# Patient Record
Sex: FEMALE | Race: OTHER | NOT HISPANIC OR LATINO | Employment: UNEMPLOYED | URBAN - METROPOLITAN AREA
[De-identification: names, ages, dates, MRNs, and addresses within clinical notes are randomized per-mention and may not be internally consistent; named-entity substitution may affect disease eponyms.]

---

## 2020-11-11 ENCOUNTER — HISTORICAL (OUTPATIENT)
Dept: ADMINISTRATIVE | Facility: HOSPITAL | Age: 40
End: 2020-11-11

## 2020-11-13 ENCOUNTER — HISTORICAL (OUTPATIENT)
Dept: SURGERY | Facility: HOSPITAL | Age: 40
End: 2020-11-13

## 2022-04-10 ENCOUNTER — HISTORICAL (OUTPATIENT)
Dept: ADMINISTRATIVE | Facility: HOSPITAL | Age: 42
End: 2022-04-10
Payer: MEDICAID

## 2022-04-11 ENCOUNTER — HISTORICAL (OUTPATIENT)
Dept: ADMINISTRATIVE | Facility: HOSPITAL | Age: 42
End: 2022-04-11
Payer: MEDICAID

## 2022-04-28 VITALS
SYSTOLIC BLOOD PRESSURE: 110 MMHG | WEIGHT: 223.31 LBS | DIASTOLIC BLOOD PRESSURE: 81 MMHG | HEIGHT: 63 IN | BODY MASS INDEX: 39.57 KG/M2

## 2022-04-28 VITALS
SYSTOLIC BLOOD PRESSURE: 110 MMHG | WEIGHT: 223.31 LBS | BODY MASS INDEX: 39.57 KG/M2 | HEIGHT: 63 IN | DIASTOLIC BLOOD PRESSURE: 81 MMHG

## 2022-05-01 NOTE — OP NOTE
Patient:   Nanda No             MRN: [id number]            FIN: [id number]               Age:   40 years     Sex:  Female     :  1980   Associated Diagnoses:   None   Author:   Bernardino Edwards MD      Name: Nanda no  MRN: [id number]  Date of Service: 20     Service: General Surgery     Attending: Dr. Rachel     Primary Surgeon: Dr. Orosco     Assistant Surgeon: Dr. Edwards     Preoperative Diagnosis: Subcutaneous cyst of back     Postoperative Diagnosis: Subcutaneous cyst of back     Procedure: Excision of cyst - back.     Anesthesia: MAC     EBL: 5cc     Specimens: Back cyst     Drains/packing: None     Implant/Devices: None     Procedure in detail:  After adequate anesthesia was achieved, the patient was appropriately prepped and draped in the standard sterile fashion in the left lateral decubitus position. A formal timeout was performed. An elliptical incison was made over the cyst measuring 3x1cm. The dissection was carried through the dermis and into the subcutaneous tissue. Care was taken not to enter cyst cavity. Dissection was carried around the cyst cavity circumferentially. The specimen was passed off the field. The soft tissue defect was closed with 3-0 vicryl. Deep dermal stitches were placed with 3-0 vicryl. The skin was reapproximated with 2-0 Nylon vertical mattress stitches.     At the end of the procedure, all lap, sponge, and instrument counts were correct.  Patient tolerated the procedure well without any obvious complications and was extubated and transferred to the recovery room.

## 2022-05-27 ENCOUNTER — TELEPHONE (OUTPATIENT)
Dept: RHEUMATOLOGY | Facility: CLINIC | Age: 42
End: 2022-05-27
Payer: MEDICAID

## 2022-05-27 NOTE — TELEPHONE ENCOUNTER
"I have not seen this patient before.  I agree that if she needs something Ogen to head to the emergency room and they will contact me.  Thank you BH        Pt called stating that she is a new patient she has an appointment at the end of June. She is worried bc her vision is becoming increasingly worst and is seeing "flashing lights". She was upset and crying worried about her health. I did instruct her that if she felt this needed to be addressed right at this moment to please go to the ER but that I would send you a message and see what we can do to get her help. Pt verbalized understanding.  Please advise  "

## 2022-07-01 ENCOUNTER — OFFICE VISIT (OUTPATIENT)
Dept: RHEUMATOLOGY | Facility: CLINIC | Age: 42
End: 2022-07-01
Payer: MEDICAID

## 2022-07-01 VITALS
HEART RATE: 82 BPM | WEIGHT: 234.19 LBS | SYSTOLIC BLOOD PRESSURE: 121 MMHG | TEMPERATURE: 98 F | RESPIRATION RATE: 18 BRPM | OXYGEN SATURATION: 99 % | HEIGHT: 66 IN | DIASTOLIC BLOOD PRESSURE: 83 MMHG | BODY MASS INDEX: 37.64 KG/M2

## 2022-07-01 DIAGNOSIS — M79.7 FIBROMYALGIA SYNDROME: ICD-10-CM

## 2022-07-01 DIAGNOSIS — G43.111 INTRACTABLE MIGRAINE WITH AURA WITH STATUS MIGRAINOSUS: ICD-10-CM

## 2022-07-01 DIAGNOSIS — G47.00 INSOMNIA, UNSPECIFIED TYPE: ICD-10-CM

## 2022-07-01 DIAGNOSIS — M35.9 UNDIFFERENTIATED CONNECTIVE TISSUE DISEASE: Primary | ICD-10-CM

## 2022-07-01 PROCEDURE — 1159F MED LIST DOCD IN RCRD: CPT | Mod: CPTII,,, | Performed by: INTERNAL MEDICINE

## 2022-07-01 PROCEDURE — 1160F PR REVIEW ALL MEDS BY PRESCRIBER/CLIN PHARMACIST DOCUMENTED: ICD-10-PCS | Mod: CPTII,,, | Performed by: INTERNAL MEDICINE

## 2022-07-01 PROCEDURE — 3008F PR BODY MASS INDEX (BMI) DOCUMENTED: ICD-10-PCS | Mod: CPTII,,, | Performed by: INTERNAL MEDICINE

## 2022-07-01 PROCEDURE — 3079F PR MOST RECENT DIASTOLIC BLOOD PRESSURE 80-89 MM HG: ICD-10-PCS | Mod: CPTII,,, | Performed by: INTERNAL MEDICINE

## 2022-07-01 PROCEDURE — 1160F RVW MEDS BY RX/DR IN RCRD: CPT | Mod: CPTII,,, | Performed by: INTERNAL MEDICINE

## 2022-07-01 PROCEDURE — 99999 PR PBB SHADOW E&M-EST. PATIENT-LVL III: ICD-10-PCS | Mod: PBBFAC,,, | Performed by: INTERNAL MEDICINE

## 2022-07-01 PROCEDURE — 3074F PR MOST RECENT SYSTOLIC BLOOD PRESSURE < 130 MM HG: ICD-10-PCS | Mod: CPTII,,, | Performed by: INTERNAL MEDICINE

## 2022-07-01 PROCEDURE — 99204 OFFICE O/P NEW MOD 45 MIN: CPT | Mod: S$PBB,,, | Performed by: INTERNAL MEDICINE

## 2022-07-01 PROCEDURE — 1159F PR MEDICATION LIST DOCUMENTED IN MEDICAL RECORD: ICD-10-PCS | Mod: CPTII,,, | Performed by: INTERNAL MEDICINE

## 2022-07-01 PROCEDURE — 3079F DIAST BP 80-89 MM HG: CPT | Mod: CPTII,,, | Performed by: INTERNAL MEDICINE

## 2022-07-01 PROCEDURE — 3074F SYST BP LT 130 MM HG: CPT | Mod: CPTII,,, | Performed by: INTERNAL MEDICINE

## 2022-07-01 PROCEDURE — 99213 OFFICE O/P EST LOW 20 MIN: CPT | Mod: PBBFAC | Performed by: INTERNAL MEDICINE

## 2022-07-01 PROCEDURE — 99999 PR PBB SHADOW E&M-EST. PATIENT-LVL III: CPT | Mod: PBBFAC,,, | Performed by: INTERNAL MEDICINE

## 2022-07-01 PROCEDURE — 99204 PR OFFICE/OUTPT VISIT, NEW, LEVL IV, 45-59 MIN: ICD-10-PCS | Mod: S$PBB,,, | Performed by: INTERNAL MEDICINE

## 2022-07-01 PROCEDURE — 3008F BODY MASS INDEX DOCD: CPT | Mod: CPTII,,, | Performed by: INTERNAL MEDICINE

## 2022-07-01 RX ORDER — HYDROXYCHLOROQUINE SULFATE 200 MG/1
200 TABLET, FILM COATED ORAL 2 TIMES DAILY
Qty: 60 TABLET | Refills: 5 | Status: SHIPPED | OUTPATIENT
Start: 2022-07-01 | End: 2022-08-26

## 2022-07-01 RX ORDER — TIZANIDINE 4 MG/1
4 TABLET ORAL NIGHTLY
Qty: 30 TABLET | Refills: 5 | Status: SHIPPED | OUTPATIENT
Start: 2022-07-01 | End: 2022-08-26

## 2022-07-01 RX ORDER — HYDROCODONE BITARTRATE AND ACETAMINOPHEN 5; 325 MG/1; MG/1
1 TABLET ORAL 2 TIMES DAILY PRN
Qty: 60 TABLET | Refills: 0 | Status: SHIPPED | OUTPATIENT
Start: 2022-07-01 | End: 2022-08-02 | Stop reason: SDUPTHER

## 2022-07-01 RX ORDER — SUMATRIPTAN SUCCINATE 100 MG/1
100 TABLET ORAL
Qty: 9 TABLET | Refills: 5 | Status: SHIPPED | OUTPATIENT
Start: 2022-07-01 | End: 2022-08-26

## 2022-07-01 RX ORDER — PROMETHAZINE HYDROCHLORIDE 12.5 MG/1
12.5 TABLET ORAL EVERY 8 HOURS
COMMUNITY
Start: 2022-06-08 | End: 2022-08-26

## 2022-07-01 RX ORDER — ALPRAZOLAM 0.5 MG/1
0.5 TABLET ORAL 2 TIMES DAILY
COMMUNITY
Start: 2022-06-06 | End: 2023-09-12 | Stop reason: SDUPTHER

## 2022-07-01 RX ORDER — LORAZEPAM 1 MG/1
1 TABLET ORAL
COMMUNITY
Start: 2022-06-07 | End: 2022-07-01 | Stop reason: ALTCHOICE

## 2022-07-01 RX ORDER — HYDROCODONE BITARTRATE AND ACETAMINOPHEN 5; 325 MG/1; MG/1
1 TABLET ORAL EVERY 6 HOURS PRN
COMMUNITY
Start: 2022-06-09 | End: 2022-07-01 | Stop reason: SDUPTHER

## 2022-07-01 NOTE — PROGRESS NOTES
"Subjective:       Patient ID: Nanda House is a 42 y.o. female.    Chief Complaint: New patient  (Referral from Julisa May Np... +LORIN.. /Patient has other concerns that she would like/To speak with you about )    Pt  is complaining of joint pain involving her MCP PIP wrist elbow shoulders hips knees and ankles bilaterally.  Is 10/10 in intensity dull in quality and continuous.  It is associated with a morning stiffness lasting for more than 60 minutes. Pt reports having difficulty maintaining a good night of sleep and this has been associated with myalgia of 10/10 in intensity.  This pain is dull continuous and gets worse mainly at night.  It is associated with fatigue.  No fever no chills no others.      Review of Systems   Constitutional: Negative for appetite change, chills and fever.   HENT: Negative for congestion, ear pain, mouth sores, nosebleeds and trouble swallowing.    Eyes: Negative for photophobia and discharge.   Respiratory: Negative for chest tightness and shortness of breath.    Cardiovascular: Negative for chest pain.   Gastrointestinal: Negative for abdominal pain and vomiting.   Endocrine: Negative.    Genitourinary: Negative for hematuria.   Musculoskeletal:        As per HPI   Skin: Negative for rash.   Neurological: Negative for weakness.         Objective:   /83 (BP Location: Right arm, Patient Position: Sitting, BP Method: Large (Automatic))   Pulse 82   Temp 97.8 °F (36.6 °C) (Oral)   Resp 18   Ht 5' 6" (1.676 m)   Wt 106.2 kg (234 lb 3.2 oz)   SpO2 99%   BMI 37.80 kg/m²      Physical Exam   Constitutional: She is oriented to person, place, and time. She appears well-developed and well-nourished. No distress.   HENT:   Head: Normocephalic and atraumatic.   Right Ear: External ear normal.   Left Ear: External ear normal.   Eyes: Pupils are equal, round, and reactive to light.   Cardiovascular: Normal rate, regular rhythm and normal heart sounds.   Pulmonary/Chest: Breath " sounds normal.   Abdominal: Soft. There is no abdominal tenderness.   Musculoskeletal:      Right shoulder: Tenderness present.      Left shoulder: Tenderness present.      Right elbow: Tenderness present.      Left elbow: Tenderness present.      Right wrist: Tenderness present.      Left wrist: Tenderness present.      Cervical back: Neck supple.      Right hip: Tenderness present.      Left hip: Tenderness present.      Right knee: Tenderness present.      Left knee: Tenderness present.      Right ankle: Tenderness present.      Left ankle: Tenderness present.   Lymphadenopathy:     She has no cervical adenopathy.   Neurological: She is alert and oriented to person, place, and time. She displays normal reflexes. No cranial nerve deficit or sensory deficit. She exhibits normal muscle tone. Coordination normal.   Skin: No rash noted. No erythema.   Vitals reviewed.      Right Side Rheumatological Exam     The patient is tender to palpation of the shoulder, elbow, wrist, knee, 1st PIP, 1st MCP, 2nd PIP, 2nd MCP, 3rd PIP, 3rd MCP, 4th PIP, 4th MCP, 5th PIP, hip, ankle, 1st MTP, 2nd MTP, 3rd MTP, 4th MTP, 5th MTP, 1st toe IP, 2nd toe IP, 3rd toe IP, 4th toe IP and 5th toe IP    Left Side Rheumatological Exam     The patient is tender to palpation of the shoulder, elbow, wrist, knee, 1st PIP, 1st MCP, 2nd PIP, 2nd MCP, 3rd PIP, 3rd MCP, 4th PIP, 4th MCP, 5th PIP, 5th MCP, hip, ankle, 1st MTP, 2nd MTP, 3rd MTP, 4th MTP, 5th MTP, 1st toe IP, 2nd toe IP, 3rd toe IP, 4th toe IP and 5th toe IP.         Completed Fibromyalgia exam 18/18 tender points.  No data to display     Assessment:       1. Undifferentiated connective tissue disease    2. Fibromyalgia syndrome    3. Insomnia, unspecified type    4. Intractable migraine with aura with status migrainosus            Plan:       Problem List Items Addressed This Visit    None     Visit Diagnoses     Undifferentiated connective tissue disease    -  Primary    Relevant  Medications    hydrOXYchloroQUINE (PLAQUENIL) 200 mg tablet    tiZANidine (ZANAFLEX) 4 MG tablet    sumatriptan (IMITREX) 100 MG tablet    HYDROcodone-acetaminophen (NORCO) 5-325 mg per tablet    Fibromyalgia syndrome        Relevant Medications    hydrOXYchloroQUINE (PLAQUENIL) 200 mg tablet    tiZANidine (ZANAFLEX) 4 MG tablet    sumatriptan (IMITREX) 100 MG tablet    HYDROcodone-acetaminophen (NORCO) 5-325 mg per tablet    Insomnia, unspecified type        Relevant Medications    hydrOXYchloroQUINE (PLAQUENIL) 200 mg tablet    tiZANidine (ZANAFLEX) 4 MG tablet    sumatriptan (IMITREX) 100 MG tablet    HYDROcodone-acetaminophen (NORCO) 5-325 mg per tablet    Intractable migraine with aura with status migrainosus        Relevant Medications    hydrOXYchloroQUINE (PLAQUENIL) 200 mg tablet    tiZANidine (ZANAFLEX) 4 MG tablet    sumatriptan (IMITREX) 100 MG tablet    HYDROcodone-acetaminophen (NORCO) 5-325 mg per tablet

## 2022-07-06 ENCOUNTER — TELEPHONE (OUTPATIENT)
Dept: RHEUMATOLOGY | Facility: CLINIC | Age: 42
End: 2022-07-06
Payer: MEDICAID

## 2022-07-06 RX ORDER — PROMETHAZINE HYDROCHLORIDE 25 MG/1
25 TABLET ORAL EVERY 4 HOURS PRN
Qty: 60 TABLET | Refills: 3 | Status: SHIPPED | OUTPATIENT
Start: 2022-07-06 | End: 2022-08-26

## 2022-07-06 NOTE — TELEPHONE ENCOUNTER
Please Advise.. Thanks       ----- Message from Whitley Mcconnell sent at 7/6/2022  1:27 PM CDT -----  Regarding: Medication concern  Patient called requesting something for nausea. She said that  the Hydroxychloroquine is making her heart race and need to know if she should continue taking this medication... Please Advise...[phone]

## 2022-07-08 ENCOUNTER — OFFICE VISIT (OUTPATIENT)
Dept: OBSTETRICS AND GYNECOLOGY | Facility: CLINIC | Age: 42
End: 2022-07-08
Payer: MEDICAID

## 2022-07-08 VITALS — DIASTOLIC BLOOD PRESSURE: 100 MMHG | WEIGHT: 237 LBS | BODY MASS INDEX: 38.25 KG/M2 | SYSTOLIC BLOOD PRESSURE: 146 MMHG

## 2022-07-08 DIAGNOSIS — Z98.891 HISTORY OF CESAREAN SECTION: ICD-10-CM

## 2022-07-08 DIAGNOSIS — M32.9 LUPUS: ICD-10-CM

## 2022-07-08 DIAGNOSIS — R10.2 PELVIC PAIN: ICD-10-CM

## 2022-07-08 DIAGNOSIS — D25.9 UTERINE LEIOMYOMA, UNSPECIFIED LOCATION: Primary | ICD-10-CM

## 2022-07-08 DIAGNOSIS — R03.0 ELEVATED BLOOD PRESSURE READING IN OFFICE WITH WHITE COAT SYNDROME, WITHOUT DIAGNOSIS OF HYPERTENSION: ICD-10-CM

## 2022-07-08 PROCEDURE — 3008F BODY MASS INDEX DOCD: CPT | Mod: CPTII,,, | Performed by: OBSTETRICS & GYNECOLOGY

## 2022-07-08 PROCEDURE — 1160F RVW MEDS BY RX/DR IN RCRD: CPT | Mod: CPTII,,, | Performed by: OBSTETRICS & GYNECOLOGY

## 2022-07-08 PROCEDURE — 1159F MED LIST DOCD IN RCRD: CPT | Mod: CPTII,,, | Performed by: OBSTETRICS & GYNECOLOGY

## 2022-07-08 PROCEDURE — 3077F PR MOST RECENT SYSTOLIC BLOOD PRESSURE >= 140 MM HG: ICD-10-PCS | Mod: CPTII,,, | Performed by: OBSTETRICS & GYNECOLOGY

## 2022-07-08 PROCEDURE — 99999 PR PBB SHADOW E&M-EST. PATIENT-LVL II: ICD-10-PCS | Mod: PBBFAC,,, | Performed by: OBSTETRICS & GYNECOLOGY

## 2022-07-08 PROCEDURE — 99204 OFFICE O/P NEW MOD 45 MIN: CPT | Mod: S$PBB,,, | Performed by: OBSTETRICS & GYNECOLOGY

## 2022-07-08 PROCEDURE — 3080F PR MOST RECENT DIASTOLIC BLOOD PRESSURE >= 90 MM HG: ICD-10-PCS | Mod: CPTII,,, | Performed by: OBSTETRICS & GYNECOLOGY

## 2022-07-08 PROCEDURE — 99212 OFFICE O/P EST SF 10 MIN: CPT | Mod: PBBFAC | Performed by: OBSTETRICS & GYNECOLOGY

## 2022-07-08 PROCEDURE — 3077F SYST BP >= 140 MM HG: CPT | Mod: CPTII,,, | Performed by: OBSTETRICS & GYNECOLOGY

## 2022-07-08 PROCEDURE — 1160F PR REVIEW ALL MEDS BY PRESCRIBER/CLIN PHARMACIST DOCUMENTED: ICD-10-PCS | Mod: CPTII,,, | Performed by: OBSTETRICS & GYNECOLOGY

## 2022-07-08 PROCEDURE — 3008F PR BODY MASS INDEX (BMI) DOCUMENTED: ICD-10-PCS | Mod: CPTII,,, | Performed by: OBSTETRICS & GYNECOLOGY

## 2022-07-08 PROCEDURE — 1159F PR MEDICATION LIST DOCUMENTED IN MEDICAL RECORD: ICD-10-PCS | Mod: CPTII,,, | Performed by: OBSTETRICS & GYNECOLOGY

## 2022-07-08 PROCEDURE — 99204 PR OFFICE/OUTPT VISIT, NEW, LEVL IV, 45-59 MIN: ICD-10-PCS | Mod: S$PBB,,, | Performed by: OBSTETRICS & GYNECOLOGY

## 2022-07-08 PROCEDURE — 99999 PR PBB SHADOW E&M-EST. PATIENT-LVL II: CPT | Mod: PBBFAC,,, | Performed by: OBSTETRICS & GYNECOLOGY

## 2022-07-08 PROCEDURE — 3080F DIAST BP >= 90 MM HG: CPT | Mod: CPTII,,, | Performed by: OBSTETRICS & GYNECOLOGY

## 2022-07-08 NOTE — PROGRESS NOTES
Subjective:    Patient ID: Nanda House is a 42 y.o. y.o. female    Chief Complaint:   Chief Complaint   Patient presents with    Pelvic Pain       History of Present Illness:  Nanda presents today for discussion of pelvic pain and bleeding.  States has been having pain associated with her cycles for several months now.  She also complains that her cycles have been more irregular.  She has some urinary frequency and constantly feeling like she just use the restroom.  She feels a heaviness in the bottom of her pelvis      Review of Systems   Constitutional: Negative for chills, fatigue and fever.   Respiratory: Negative for shortness of breath.    Cardiovascular: Negative for chest pain.   Gastrointestinal: Negative for abdominal pain, constipation, diarrhea and nausea.   Genitourinary: Positive for frequency and pelvic pain. Negative for bladder incontinence, dysuria, hot flashes and vaginal bleeding.   Neurological: Negative for headaches.   Psychiatric/Behavioral: Negative for depression.         Objective:    Vital Signs:  Vitals:    07/08/22 1022   BP: (!) 146/100     Wt Readings from Last 1 Encounters:   07/08/22 107.5 kg (237 lb)     Body mass index is 38.25 kg/m².    Physical Exam:  General:  alert, no distress   Abdomen:  Soft, nontender.  Mass noted in suprapubic region consistent with enlarged fibroid uterus. no rebound or guarding noted   Extremities: No cyanosis, clubbing, edema     Ul[facility]ound from [facility] reviewed.  Enlarged uterus with 2 cm endometrial stripe and 5 cm anterior fundal fibroid.  Multiple nabothian cysts noted some are large almost 2 cm in diameter    Discussed with patient that the large anterior fibroid is most likely pressing on her bladder causing her the symptoms of urgency and frequency as well as the feeling of fullness and pressure in her pelvis.  Discussed that hysterectomy would be most definitive in treatment of many of her symptoms.  Also explained the workup  involved and that is necessary.  Her history includes  and newly diagnosed lupus.  Explained she will require medical clearance from her rheumatologist prior to surgery. She understands this need.  All questions answered    I spent a total of 45 minutes on the day of the visit.  This includes face to face time and non-face to face time preparing to see the patient (eg, review of tests), obtaining and/or reviewing separately obtained history, documenting clinical information in the electronic or other health record, independently interpreting results and communicating results to the patient/family/caregiver, or care coordinator.      Assessment:      1. Uterine leiomyoma, unspecified location    2. History of  section    3. Pelvic pain    4. Lupus    5. Elevated blood pressure reading in office with white coat syndrome, without diagnosis of hypertension          Plan:      Uterine leiomyoma, unspecified location    History of  section    Pelvic pain    Lupus    Elevated blood pressure reading in office with white coat syndrome, without diagnosis of hypertension       Endometrial biopsy and ultrasound  Will schedule total laparoscopic hysterectomy       Amira Ryder MD, FACOG   2022 10:41 AM

## 2022-07-11 ENCOUNTER — TELEPHONE (OUTPATIENT)
Dept: RHEUMATOLOGY | Facility: CLINIC | Age: 42
End: 2022-07-11
Payer: MEDICAID

## 2022-07-11 NOTE — TELEPHONE ENCOUNTER
Please Advise.. Thanks       ----- Message from Whitley Mcconnell sent at 7/11/2022  7:56 AM CDT -----  Regarding: Surgery clearance  Patient called requesting clearance to have surgery. Is she healthy enough for anesthesia. Please call.

## 2022-07-18 ENCOUNTER — TELEPHONE (OUTPATIENT)
Dept: OBSTETRICS AND GYNECOLOGY | Facility: CLINIC | Age: 42
End: 2022-07-18

## 2022-07-18 RX ORDER — NAPROXEN 500 MG/1
TABLET ORAL
Qty: 20 TABLET | Refills: 0 | Status: SHIPPED | OUTPATIENT
Start: 2022-07-18 | End: 2022-08-26

## 2022-07-18 NOTE — TELEPHONE ENCOUNTER
Pt called about procedure appt for EMBX on 7/22; she said Dr. Ryder told her that the procedure would be painful, and is asking for something to be sent to the pharmacy before the appt. Pt is also supposed to have surgery soon

## 2022-07-25 ENCOUNTER — PROCEDURE VISIT (OUTPATIENT)
Dept: OBSTETRICS AND GYNECOLOGY | Facility: CLINIC | Age: 42
End: 2022-07-25
Payer: MEDICAID

## 2022-07-25 VITALS — DIASTOLIC BLOOD PRESSURE: 110 MMHG | SYSTOLIC BLOOD PRESSURE: 150 MMHG | BODY MASS INDEX: 39.22 KG/M2 | WEIGHT: 243 LBS

## 2022-07-25 VITALS — BODY MASS INDEX: 39.22 KG/M2 | WEIGHT: 243 LBS

## 2022-07-25 DIAGNOSIS — M32.9 LUPUS: ICD-10-CM

## 2022-07-25 DIAGNOSIS — N93.9 VAGINAL BLEEDING: Primary | ICD-10-CM

## 2022-07-25 DIAGNOSIS — D25.9 UTERINE LEIOMYOMA, UNSPECIFIED LOCATION: ICD-10-CM

## 2022-07-25 DIAGNOSIS — N92.1 MENORRHAGIA WITH IRREGULAR CYCLE: ICD-10-CM

## 2022-07-25 DIAGNOSIS — F41.9 ANXIETY: ICD-10-CM

## 2022-07-25 DIAGNOSIS — Z32.02 NEGATIVE PREGNANCY TEST: Primary | ICD-10-CM

## 2022-07-25 DIAGNOSIS — R93.89 THICKENED ENDOMETRIUM: ICD-10-CM

## 2022-07-25 DIAGNOSIS — R10.2 PELVIC PAIN: ICD-10-CM

## 2022-07-25 DIAGNOSIS — R03.0 ELEVATED BLOOD PRESSURE READING IN OFFICE WITH WHITE COAT SYNDROME, WITHOUT DIAGNOSIS OF HYPERTENSION: ICD-10-CM

## 2022-07-25 LAB
B-HCG UR QL: NEGATIVE
CTP QC/QA: YES

## 2022-07-25 PROCEDURE — 58100 ENDOMETRIAL BIOPSY: ICD-10-PCS | Mod: S$PBB,,, | Performed by: OBSTETRICS & GYNECOLOGY

## 2022-07-25 PROCEDURE — 81025 URINE PREGNANCY TEST: CPT | Mod: PBBFAC | Performed by: OBSTETRICS & GYNECOLOGY

## 2022-07-25 PROCEDURE — 58100 BIOPSY OF UTERUS LINING: CPT | Mod: PBBFAC | Performed by: OBSTETRICS & GYNECOLOGY

## 2022-07-25 NOTE — PROCEDURES
Endometrial biopsy    Date/Time: 7/25/2022 1:30 PM  Performed by: Amira Ryder MD  Authorized by: Amira Ryder MD     Consent:     Consent obtained:  Written    Consent given by:  Patient    Patient questions answered: yes      Patient agrees, verbalizes understanding, and wants to proceed: yes      Instructions and paperwork completed: yes    Indication:     Indications: Menorrhagia    Pre-procedure:     Pre-procedure timeout performed: yes    Procedure:     Procedure: endometrial biopsy with Pipelle      Cervix cleaned and prepped: yes (betadine)      The cervix was dilated: yes (os finder used)      Uterus sounded: yes      Uterus sound depth (cm):  7    Specimen collected: specimen collected and sent to pathology      Patient tolerated procedure well with no complications: yes    Comments:     Procedure comments:  Minimal tissue obtained with 2 passes.     Ultrasound shows 4.8 cm fibroid. Endo thickness 1 cm with probable 1.8 cm endometrial polyp seen.

## 2022-07-29 NOTE — PROGRESS NOTES
Please call patient regarding results.  No endometrial tissue seen. Will need to do D&C with frozen before her hyst. Please also notify OR /surgery department of this so we can schedule pathologist to be present

## 2022-08-02 DIAGNOSIS — G47.00 INSOMNIA, UNSPECIFIED TYPE: ICD-10-CM

## 2022-08-02 DIAGNOSIS — M79.7 FIBROMYALGIA SYNDROME: ICD-10-CM

## 2022-08-02 DIAGNOSIS — M35.9 UNDIFFERENTIATED CONNECTIVE TISSUE DISEASE: ICD-10-CM

## 2022-08-02 DIAGNOSIS — G43.111 INTRACTABLE MIGRAINE WITH AURA WITH STATUS MIGRAINOSUS: ICD-10-CM

## 2022-08-03 RX ORDER — HYDROCODONE BITARTRATE AND ACETAMINOPHEN 5; 325 MG/1; MG/1
1 TABLET ORAL 2 TIMES DAILY PRN
Qty: 60 TABLET | Refills: 0 | Status: ON HOLD | OUTPATIENT
Start: 2022-08-03 | End: 2022-08-31 | Stop reason: HOSPADM

## 2022-08-09 LAB — TISSUE SPECIMEN TO PATHOLOGY, OB/GYN: NORMAL

## 2022-08-10 ENCOUNTER — TELEPHONE (OUTPATIENT)
Dept: OBSTETRICS AND GYNECOLOGY | Facility: CLINIC | Age: 42
End: 2022-08-10
Payer: MEDICAID

## 2022-08-10 NOTE — TELEPHONE ENCOUNTER
Pt called, states she does not want a hysterectomy anymore. Wants to change her surgery to cyst removal/endometriosis removal.

## 2022-08-10 NOTE — TELEPHONE ENCOUNTER
"If the "cyst removal" is in reference to the Nabothian cyst. They are only removed with the cervix (as in hysterectomy). What I can do is a D&C with hysteroscopy since the endometrial biopsy was basically inconclusive. (currently scheduled for frozen section at time of hysterectomy) and laparoscopy with evaluation and ablation of any endometriosis implants visualized."

## 2022-08-24 ENCOUNTER — ANESTHESIA EVENT (OUTPATIENT)
Dept: SURGERY | Facility: HOSPITAL | Age: 42
DRG: 743 | End: 2022-08-24
Payer: MEDICAID

## 2022-08-25 NOTE — DISCHARGE INSTRUCTIONS
BEFORE THE PROCEDURE:    REPORT ANY CHANGE IN YOUR PHYSICAL CONDITION TO YOUR DOCTOR IMMEDIATELY.  SELF ISOLATE AND CHECK TEMPERATURE DAILY, IF TEMP OVER 100, CALL PHYSICIAN IMMEDIATELY.  TRY TO REFRAIN FROM SMOKING AND ALCOHOL 72 HOURS BEFORE YOUR PROCEDURE.   DO NOT EAT OR DRINK ANYTHING AFTER MIDNIGHT THE NIGHT BEFORE YOUR PROCEDURE.  NO MAKE UP, NAIL POLISH OR JEWELRY.      SURGERY PREP INSTRUCTIONS    CHECK INTO FIRST FLOOR REGISTRATION AT 8 A.M.      DAY OF YOUR PROCEDURE:    TAKE BLOOD PRESSURE MEDICATIONS THE MORNING OF YOUR PROCEDURE, WITH SMALL SIPS WATER, AS DIRECTED BY YOUR PHYSICIAN.   DO NOT TAKE ANY DIABETIC MEDICATIONS UNLESS DIRECTED TO DO SO BY YOUR PHYSICIAN.   CONTACT LENSES AND DENTURES MUST BE REMOVED.  A RESPONSIBLE ADULT MUST ACCOMPANY YOU HOME UPON DISCHARGE.   ONLY 1 VISITOR ALLOWED PER ROOM.     Covid testing today    YOUR THOUGHTS AND OPINIONS HELP US TO BETTER SERVE YOU.     PLEASE PARTICIPATE IN SURVEYS ABOUT YOUR CARE.    THANK YOU FOR CHOOSING [facility].

## 2022-08-26 ENCOUNTER — HOSPITAL ENCOUNTER (OUTPATIENT)
Dept: RADIOLOGY | Facility: HOSPITAL | Age: 42
Discharge: HOME | End: 2022-08-26
Attending: OBSTETRICS & GYNECOLOGY
Payer: MEDICAID

## 2022-08-26 ENCOUNTER — HOSPITAL ENCOUNTER (OUTPATIENT)
Dept: PULMONOLOGY | Facility: HOSPITAL | Age: 42
Discharge: HOME | End: 2022-08-26
Attending: OBSTETRICS & GYNECOLOGY
Payer: MEDICAID

## 2022-08-26 ENCOUNTER — OFFICE VISIT (OUTPATIENT)
Dept: OBSTETRICS AND GYNECOLOGY | Facility: CLINIC | Age: 42
End: 2022-08-26
Payer: MEDICAID

## 2022-08-26 ENCOUNTER — HOSPITAL ENCOUNTER (OUTPATIENT)
Dept: PREADMISSION TESTING | Facility: HOSPITAL | Age: 42
Discharge: HOME | End: 2022-08-26
Attending: OBSTETRICS & GYNECOLOGY
Payer: MEDICAID

## 2022-08-26 VITALS
WEIGHT: 234 LBS | SYSTOLIC BLOOD PRESSURE: 128 MMHG | BODY MASS INDEX: 37.61 KG/M2 | HEIGHT: 66 IN | DIASTOLIC BLOOD PRESSURE: 88 MMHG | HEART RATE: 80 BPM

## 2022-08-26 VITALS — BODY MASS INDEX: 33.43 KG/M2 | HEIGHT: 66 IN | WEIGHT: 208 LBS

## 2022-08-26 DIAGNOSIS — R10.2 PELVIC PAIN: Primary | ICD-10-CM

## 2022-08-26 DIAGNOSIS — F41.9 ANXIETY: ICD-10-CM

## 2022-08-26 DIAGNOSIS — D25.9 UTERINE LEIOMYOMA, UNSPECIFIED LOCATION: ICD-10-CM

## 2022-08-26 DIAGNOSIS — M32.9 LUPUS: ICD-10-CM

## 2022-08-26 DIAGNOSIS — F41.9 ANXIETY: Primary | ICD-10-CM

## 2022-08-26 DIAGNOSIS — Z01.818 PRE-OP TESTING: ICD-10-CM

## 2022-08-26 DIAGNOSIS — R10.2 PELVIC PAIN: ICD-10-CM

## 2022-08-26 DIAGNOSIS — R93.89 THICKENED ENDOMETRIUM: ICD-10-CM

## 2022-08-26 DIAGNOSIS — N92.1 MENORRHAGIA WITH IRREGULAR CYCLE: ICD-10-CM

## 2022-08-26 DIAGNOSIS — R03.0 ELEVATED BLOOD PRESSURE READING IN OFFICE WITH WHITE COAT SYNDROME, WITHOUT DIAGNOSIS OF HYPERTENSION: ICD-10-CM

## 2022-08-26 DIAGNOSIS — Z98.891 HISTORY OF CESAREAN SECTION: ICD-10-CM

## 2022-08-26 DIAGNOSIS — D21.9 FIBROIDS: ICD-10-CM

## 2022-08-26 PROCEDURE — 3008F BODY MASS INDEX DOCD: CPT | Mod: CPTII,,, | Performed by: OBSTETRICS & GYNECOLOGY

## 2022-08-26 PROCEDURE — 1160F PR REVIEW ALL MEDS BY PRESCRIBER/CLIN PHARMACIST DOCUMENTED: ICD-10-PCS | Mod: CPTII,,, | Performed by: OBSTETRICS & GYNECOLOGY

## 2022-08-26 PROCEDURE — 3074F PR MOST RECENT SYSTOLIC BLOOD PRESSURE < 130 MM HG: ICD-10-PCS | Mod: CPTII,,, | Performed by: OBSTETRICS & GYNECOLOGY

## 2022-08-26 PROCEDURE — 99999 PR PBB SHADOW E&M-EST. PATIENT-LVL II: ICD-10-PCS | Mod: PBBFAC,,, | Performed by: OBSTETRICS & GYNECOLOGY

## 2022-08-26 PROCEDURE — 1160F RVW MEDS BY RX/DR IN RCRD: CPT | Mod: CPTII,,, | Performed by: OBSTETRICS & GYNECOLOGY

## 2022-08-26 PROCEDURE — 71046 X-RAY EXAM CHEST 2 VIEWS: CPT | Mod: TC

## 2022-08-26 PROCEDURE — 99213 PR OFFICE/OUTPT VISIT, EST, LEVL III, 20-29 MIN: ICD-10-PCS | Mod: S$PBB,,, | Performed by: OBSTETRICS & GYNECOLOGY

## 2022-08-26 PROCEDURE — 99213 OFFICE O/P EST LOW 20 MIN: CPT | Mod: S$PBB,,, | Performed by: OBSTETRICS & GYNECOLOGY

## 2022-08-26 PROCEDURE — 93005 ELECTROCARDIOGRAM TRACING: CPT

## 2022-08-26 PROCEDURE — 3074F SYST BP LT 130 MM HG: CPT | Mod: CPTII,,, | Performed by: OBSTETRICS & GYNECOLOGY

## 2022-08-26 PROCEDURE — 93010 EKG 12-LEAD: ICD-10-PCS | Mod: ,,, | Performed by: INTERNAL MEDICINE

## 2022-08-26 PROCEDURE — 3008F PR BODY MASS INDEX (BMI) DOCUMENTED: ICD-10-PCS | Mod: CPTII,,, | Performed by: OBSTETRICS & GYNECOLOGY

## 2022-08-26 PROCEDURE — 99212 OFFICE O/P EST SF 10 MIN: CPT | Mod: PBBFAC,25 | Performed by: OBSTETRICS & GYNECOLOGY

## 2022-08-26 PROCEDURE — 1159F PR MEDICATION LIST DOCUMENTED IN MEDICAL RECORD: ICD-10-PCS | Mod: CPTII,,, | Performed by: OBSTETRICS & GYNECOLOGY

## 2022-08-26 PROCEDURE — 3079F DIAST BP 80-89 MM HG: CPT | Mod: CPTII,,, | Performed by: OBSTETRICS & GYNECOLOGY

## 2022-08-26 PROCEDURE — 3079F PR MOST RECENT DIASTOLIC BLOOD PRESSURE 80-89 MM HG: ICD-10-PCS | Mod: CPTII,,, | Performed by: OBSTETRICS & GYNECOLOGY

## 2022-08-26 PROCEDURE — 99999 PR PBB SHADOW E&M-EST. PATIENT-LVL II: CPT | Mod: PBBFAC,,, | Performed by: OBSTETRICS & GYNECOLOGY

## 2022-08-26 PROCEDURE — 1159F MED LIST DOCD IN RCRD: CPT | Mod: CPTII,,, | Performed by: OBSTETRICS & GYNECOLOGY

## 2022-08-26 PROCEDURE — 93010 ELECTROCARDIOGRAM REPORT: CPT | Mod: ,,, | Performed by: INTERNAL MEDICINE

## 2022-08-26 RX ORDER — SODIUM CHLORIDE, SODIUM LACTATE, POTASSIUM CHLORIDE, CALCIUM CHLORIDE 600; 310; 30; 20 MG/100ML; MG/100ML; MG/100ML; MG/100ML
INJECTION, SOLUTION INTRAVENOUS CONTINUOUS
Status: CANCELLED | OUTPATIENT
Start: 2022-08-26

## 2022-08-26 RX ORDER — DIPHENHYDRAMINE HCL 50 MG
12 CAPSULE ORAL 2 TIMES DAILY
Status: ON HOLD | COMMUNITY
End: 2022-08-31 | Stop reason: HOSPADM

## 2022-08-26 RX ORDER — MUPIROCIN 20 MG/G
OINTMENT TOPICAL
Status: CANCELLED | OUTPATIENT
Start: 2022-08-26

## 2022-08-26 NOTE — PROGRESS NOTES
History & Physical    SUBJECTIVE:     History of Present Illness:  Patient is a 42 y.o. female presents for preop for hysterectomy.  Patient recently had endometrial biopsy which was inconclusive.  It is now recommended for D&C with frozen section to evaluate uterine lining just prior to hysterectomy.  Patient states she has been having long history of pelvic pain menorrhagia and fibroids.  She desires more definitive management at this time.    Chief Complaint   Patient presents with    Pre-op Exam       Review of patient's allergies indicates:  No Known Allergies    Current Outpatient Medications   Medication Sig Dispense Refill    ALPRAZolam (XANAX) 0.5 MG tablet Take 0.5 mg by mouth 2 (two) times daily.      HYDROcodone-acetaminophen (NORCO) 5-325 mg per tablet Take 1 tablet by mouth 2 (two) times daily as needed for Pain. 60 tablet 0    hydrOXYchloroQUINE (PLAQUENIL) 200 mg tablet Take 1 tablet (200 mg total) by mouth 2 (two) times daily. After food 60 tablet 5    naproxen (NAPROSYN) 500 MG tablet Take 1 tablet by mouth 30 min prior to procedure and my repeat every 8 hours after procedure prn cramping 20 tablet 0    promethazine (PHENERGAN) 12.5 MG Tab Take 12.5 mg by mouth every 8 (eight) hours.      promethazine (PHENERGAN) 25 MG tablet Take 1 tablet (25 mg total) by mouth every 4 (four) hours as needed for Nausea. 60 tablet 3    sumatriptan (IMITREX) 100 MG tablet Take 1 tablet (100 mg total) by mouth every 2 (two) hours as needed for Migraine (take one tab when the migraine starts ,you can take a second tab in 2 hours. max 2 tab per 24 hours.). 9 tablet 5    tiZANidine (ZANAFLEX) 4 MG tablet Take 1 tablet (4 mg total) by mouth nightly. 30 tablet 5     No current facility-administered medications for this visit.       History reviewed. No pertinent past medical history.  Past Surgical History:   Procedure Laterality Date     SECTION      FACIAL RECONSTRUCTION SURGERY      TUBAL LIGATION    "    Family History   Adopted: Yes   Problem Relation Age of Onset    No Known Problems Mother     No Known Problems Father      Social History     Tobacco Use    Smoking status: Never Smoker    Smokeless tobacco: Never Used   Substance Use Topics    Alcohol use: Never    Drug use: Yes     Types: Marijuana     Comment: Medical Marijuana        Review of Systems:  Review of Systems   Constitutional: Negative for chills and fever.   Respiratory: Negative for cough.    Cardiovascular: Negative for chest pain.   Gastrointestinal: Negative for abdominal pain, constipation, diarrhea, nausea and vomiting.   Genitourinary: Positive for pelvic pain and vaginal bleeding.   Psychiatric/Behavioral: The patient is nervous/anxious.        OBJECTIVE:     Vital Signs (Most Recent)  Pulse: 80 (08/26/22 0957)  BP: 128/88 (08/26/22 0957)  5' 6" (1.676 m)  106.1 kg (234 lb)     Physical Exam:  Physical Exam  Constitutional:       Appearance: Normal appearance.   Cardiovascular:      Rate and Rhythm: Normal rate and regular rhythm.      Heart sounds: No murmur heard.  Pulmonary:      Effort: Pulmonary effort is normal.      Breath sounds: Normal breath sounds. No wheezing.   Abdominal:      General: Bowel sounds are normal. There is no distension.      Palpations: Abdomen is soft.   Genitourinary:     Cervix: Normal.      Uterus: Enlarged (c/w fibroids).       Adnexa: Right adnexa normal and left adnexa normal.   Musculoskeletal:         General: Normal range of motion.      Right lower leg: No edema.      Left lower leg: No edema.   Neurological:      Mental Status: She is alert.         Laboratory  Ordered    Diagnostic Results:  Ordered     Procedure explained including all risks, benefits, alternatives.  Discussed the need for D&C with frozen section due to inconclusive endometrial biopsy in the office.  Patient wishes for ovaries to be spared unless they look extremely abnormal.  All questions answered and consent " obtained    ASSESSMENT/PLAN:     Uterine fibroids  Anxiety  Lupus   Menorrhagia  History of   Thickened endometrium  Pelvic pain    PLAN:Plan     D&C with frozen sections then laparoscopic hysterectomy

## 2022-08-29 ENCOUNTER — HOSPITAL ENCOUNTER (INPATIENT)
Facility: HOSPITAL | Age: 42
LOS: 2 days | Discharge: HOME | DRG: 743 | End: 2022-08-31
Attending: OBSTETRICS & GYNECOLOGY | Admitting: OBSTETRICS & GYNECOLOGY
Payer: MEDICAID

## 2022-08-29 ENCOUNTER — ANESTHESIA (OUTPATIENT)
Dept: SURGERY | Facility: HOSPITAL | Age: 42
DRG: 743 | End: 2022-08-29
Payer: MEDICAID

## 2022-08-29 DIAGNOSIS — D25.9 UTERINE LEIOMYOMA, UNSPECIFIED LOCATION: ICD-10-CM

## 2022-08-29 DIAGNOSIS — D21.9 FIBROIDS: ICD-10-CM

## 2022-08-29 DIAGNOSIS — N92.1 MENORRHAGIA WITH IRREGULAR CYCLE: ICD-10-CM

## 2022-08-29 PROBLEM — R93.89 THICKENED ENDOMETRIUM: Status: ACTIVE | Noted: 2022-08-29

## 2022-08-29 PROBLEM — Z98.891 HISTORY OF C-SECTION: Status: ACTIVE | Noted: 2022-08-29

## 2022-08-29 PROBLEM — F41.9 ANXIETY: Status: ACTIVE | Noted: 2022-08-29

## 2022-08-29 PROBLEM — R10.2 PELVIC PAIN: Status: ACTIVE | Noted: 2022-08-29

## 2022-08-29 PROBLEM — M32.9 LUPUS: Status: ACTIVE | Noted: 2022-08-29

## 2022-08-29 PROBLEM — N92.0 MENORRHAGIA WITH REGULAR CYCLE: Status: ACTIVE | Noted: 2022-08-29

## 2022-08-29 LAB
ABO + RH BLD: NORMAL
B-HCG UR QL: NEGATIVE
BLD GP AB SCN CELLS X3 SERPL QL: NORMAL

## 2022-08-29 PROCEDURE — 36000709 HC OR TIME LEV III EA ADD 15 MIN: Performed by: OBSTETRICS & GYNECOLOGY

## 2022-08-29 PROCEDURE — 25000003 PHARM REV CODE 250: Performed by: OBSTETRICS & GYNECOLOGY

## 2022-08-29 PROCEDURE — 63600175 PHARM REV CODE 636 W HCPCS: Performed by: OBSTETRICS & GYNECOLOGY

## 2022-08-29 PROCEDURE — 58150 TOTAL HYSTERECTOMY: CPT | Mod: ,,, | Performed by: OBSTETRICS & GYNECOLOGY

## 2022-08-29 PROCEDURE — 63600175 PHARM REV CODE 636 W HCPCS: Performed by: ANESTHESIOLOGY

## 2022-08-29 PROCEDURE — 63600175 PHARM REV CODE 636 W HCPCS: Performed by: NURSE ANESTHETIST, CERTIFIED REGISTERED

## 2022-08-29 PROCEDURE — 11000001 HC ACUTE MED/SURG PRIVATE ROOM

## 2022-08-29 PROCEDURE — 99900035 HC TECH TIME PER 15 MIN (STAT)

## 2022-08-29 PROCEDURE — 99900031 HC PATIENT EDUCATION (STAT)

## 2022-08-29 PROCEDURE — 27000221 HC OXYGEN, UP TO 24 HOURS

## 2022-08-29 PROCEDURE — 37000009 HC ANESTHESIA EA ADD 15 MINS: Performed by: OBSTETRICS & GYNECOLOGY

## 2022-08-29 PROCEDURE — 36000708 HC OR TIME LEV III 1ST 15 MIN: Performed by: OBSTETRICS & GYNECOLOGY

## 2022-08-29 PROCEDURE — 58150 PR TOTAL ABDOM HYSTERECTOMY: ICD-10-PCS | Mod: ,,, | Performed by: OBSTETRICS & GYNECOLOGY

## 2022-08-29 PROCEDURE — 37000008 HC ANESTHESIA 1ST 15 MINUTES: Performed by: OBSTETRICS & GYNECOLOGY

## 2022-08-29 PROCEDURE — 36415 COLL VENOUS BLD VENIPUNCTURE: CPT | Performed by: OBSTETRICS & GYNECOLOGY

## 2022-08-29 PROCEDURE — 94799 UNLISTED PULMONARY SVC/PX: CPT

## 2022-08-29 PROCEDURE — 81025 URINE PREGNANCY TEST: CPT | Performed by: OBSTETRICS & GYNECOLOGY

## 2022-08-29 PROCEDURE — 27201423 OPTIME MED/SURG SUP & DEVICES STERILE SUPPLY: Performed by: OBSTETRICS & GYNECOLOGY

## 2022-08-29 PROCEDURE — 86901 BLOOD TYPING SEROLOGIC RH(D): CPT | Performed by: OBSTETRICS & GYNECOLOGY

## 2022-08-29 PROCEDURE — 94761 N-INVAS EAR/PLS OXIMETRY MLT: CPT

## 2022-08-29 PROCEDURE — 25000003 PHARM REV CODE 250: Performed by: NURSE ANESTHETIST, CERTIFIED REGISTERED

## 2022-08-29 PROCEDURE — 71000033 HC RECOVERY, INTIAL HOUR: Performed by: OBSTETRICS & GYNECOLOGY

## 2022-08-29 RX ORDER — HYDROMORPHONE HYDROCHLORIDE 1 MG/ML
0.5 INJECTION, SOLUTION INTRAMUSCULAR; INTRAVENOUS; SUBCUTANEOUS EVERY 5 MIN PRN
Status: DISCONTINUED | OUTPATIENT
Start: 2022-08-29 | End: 2022-08-29

## 2022-08-29 RX ORDER — ACETAMINOPHEN 10 MG/ML
INJECTION, SOLUTION INTRAVENOUS
Status: DISCONTINUED | OUTPATIENT
Start: 2022-08-29 | End: 2022-08-29

## 2022-08-29 RX ORDER — FENTANYL CITRATE 50 UG/ML
INJECTION, SOLUTION INTRAMUSCULAR; INTRAVENOUS
Status: DISCONTINUED | OUTPATIENT
Start: 2022-08-29 | End: 2022-08-29

## 2022-08-29 RX ORDER — MIDAZOLAM HYDROCHLORIDE 1 MG/ML
INJECTION INTRAMUSCULAR; INTRAVENOUS
Status: DISCONTINUED | OUTPATIENT
Start: 2022-08-29 | End: 2022-08-29

## 2022-08-29 RX ORDER — OXYCODONE AND ACETAMINOPHEN 10; 325 MG/1; MG/1
1 TABLET ORAL EVERY 4 HOURS PRN
Status: DISCONTINUED | OUTPATIENT
Start: 2022-08-29 | End: 2022-08-31 | Stop reason: HOSPADM

## 2022-08-29 RX ORDER — DIPHENHYDRAMINE HYDROCHLORIDE 50 MG/ML
25 INJECTION INTRAMUSCULAR; INTRAVENOUS EVERY 6 HOURS PRN
Status: DISCONTINUED | OUTPATIENT
Start: 2022-08-29 | End: 2022-08-29

## 2022-08-29 RX ORDER — HYDROMORPHONE HYDROCHLORIDE 1 MG/ML
1 INJECTION, SOLUTION INTRAMUSCULAR; INTRAVENOUS; SUBCUTANEOUS EVERY 6 HOURS PRN
Status: DISCONTINUED | OUTPATIENT
Start: 2022-08-29 | End: 2022-08-31 | Stop reason: HOSPADM

## 2022-08-29 RX ORDER — PROCHLORPERAZINE EDISYLATE 5 MG/ML
5 INJECTION INTRAMUSCULAR; INTRAVENOUS ONCE
Status: COMPLETED | OUTPATIENT
Start: 2022-08-29 | End: 2022-08-29

## 2022-08-29 RX ORDER — IBUPROFEN 600 MG/1
600 TABLET ORAL EVERY 6 HOURS
Status: DISCONTINUED | OUTPATIENT
Start: 2022-08-30 | End: 2022-08-31 | Stop reason: HOSPADM

## 2022-08-29 RX ORDER — VASOPRESSIN 20 [USP'U]/ML
INJECTION, SOLUTION INTRAMUSCULAR; SUBCUTANEOUS
Status: DISCONTINUED | OUTPATIENT
Start: 2022-08-29 | End: 2022-08-29 | Stop reason: HOSPADM

## 2022-08-29 RX ORDER — ONDANSETRON 4 MG/1
8 TABLET, ORALLY DISINTEGRATING ORAL EVERY 8 HOURS PRN
Status: DISCONTINUED | OUTPATIENT
Start: 2022-08-29 | End: 2022-08-31 | Stop reason: HOSPADM

## 2022-08-29 RX ORDER — NEOSTIGMINE METHYLSULFATE 5 MG/5 ML
SYRINGE (ML) INTRAVENOUS
Status: DISCONTINUED | OUTPATIENT
Start: 2022-08-29 | End: 2022-08-29

## 2022-08-29 RX ORDER — MUPIROCIN 20 MG/G
OINTMENT TOPICAL
Status: DISCONTINUED | OUTPATIENT
Start: 2022-08-29 | End: 2022-08-29 | Stop reason: HOSPADM

## 2022-08-29 RX ORDER — MORPHINE SULFATE 10 MG/ML
INJECTION, SOLUTION INTRAMUSCULAR; INTRAVENOUS
Status: DISCONTINUED | OUTPATIENT
Start: 2022-08-29 | End: 2022-08-29

## 2022-08-29 RX ORDER — SODIUM CHLORIDE, SODIUM LACTATE, POTASSIUM CHLORIDE, CALCIUM CHLORIDE 600; 310; 30; 20 MG/100ML; MG/100ML; MG/100ML; MG/100ML
INJECTION, SOLUTION INTRAVENOUS CONTINUOUS
Status: DISCONTINUED | OUTPATIENT
Start: 2022-08-29 | End: 2022-08-29

## 2022-08-29 RX ORDER — MUPIROCIN 20 MG/G
OINTMENT TOPICAL 2 TIMES DAILY
Status: DISCONTINUED | OUTPATIENT
Start: 2022-08-29 | End: 2022-08-31 | Stop reason: HOSPADM

## 2022-08-29 RX ORDER — DEXAMETHASONE SODIUM PHOSPHATE 4 MG/ML
INJECTION, SOLUTION INTRA-ARTICULAR; INTRALESIONAL; INTRAMUSCULAR; INTRAVENOUS; SOFT TISSUE
Status: DISCONTINUED | OUTPATIENT
Start: 2022-08-29 | End: 2022-08-29

## 2022-08-29 RX ORDER — ONDANSETRON 2 MG/ML
4 INJECTION INTRAMUSCULAR; INTRAVENOUS DAILY PRN
Status: DISCONTINUED | OUTPATIENT
Start: 2022-08-29 | End: 2022-08-29

## 2022-08-29 RX ORDER — DIPHENHYDRAMINE HYDROCHLORIDE 50 MG/ML
25 INJECTION INTRAMUSCULAR; INTRAVENOUS EVERY 4 HOURS PRN
Status: DISCONTINUED | OUTPATIENT
Start: 2022-08-29 | End: 2022-08-31 | Stop reason: HOSPADM

## 2022-08-29 RX ORDER — MORPHINE SULFATE 4 MG/ML
4 INJECTION, SOLUTION INTRAMUSCULAR; INTRAVENOUS EVERY 5 MIN PRN
Status: DISCONTINUED | OUTPATIENT
Start: 2022-08-29 | End: 2022-08-29

## 2022-08-29 RX ORDER — CEFAZOLIN SODIUM 2 G/50ML
2 SOLUTION INTRAVENOUS
Status: COMPLETED | OUTPATIENT
Start: 2022-08-29 | End: 2022-08-29

## 2022-08-29 RX ORDER — OXYCODONE AND ACETAMINOPHEN 5; 325 MG/1; MG/1
1 TABLET ORAL EVERY 4 HOURS PRN
Status: DISCONTINUED | OUTPATIENT
Start: 2022-08-29 | End: 2022-08-31 | Stop reason: HOSPADM

## 2022-08-29 RX ORDER — PROCHLORPERAZINE EDISYLATE 5 MG/ML
5 INJECTION INTRAMUSCULAR; INTRAVENOUS EVERY 6 HOURS PRN
Status: DISCONTINUED | OUTPATIENT
Start: 2022-08-29 | End: 2022-08-30

## 2022-08-29 RX ORDER — PROPOFOL 10 MG/ML
VIAL (ML) INTRAVENOUS
Status: DISCONTINUED | OUTPATIENT
Start: 2022-08-29 | End: 2022-08-29

## 2022-08-29 RX ORDER — ONDANSETRON HYDROCHLORIDE 2 MG/ML
INJECTION, SOLUTION INTRAMUSCULAR; INTRAVENOUS
Status: DISCONTINUED | OUTPATIENT
Start: 2022-08-29 | End: 2022-08-29

## 2022-08-29 RX ORDER — BISACODYL 10 MG
10 SUPPOSITORY, RECTAL RECTAL DAILY PRN
Status: DISCONTINUED | OUTPATIENT
Start: 2022-08-29 | End: 2022-08-31 | Stop reason: HOSPADM

## 2022-08-29 RX ORDER — ROCURONIUM BROMIDE 10 MG/ML
INJECTION, SOLUTION INTRAVENOUS
Status: DISCONTINUED | OUTPATIENT
Start: 2022-08-29 | End: 2022-08-29

## 2022-08-29 RX ORDER — ALPRAZOLAM 0.5 MG/1
0.5 TABLET ORAL 2 TIMES DAILY
Status: DISCONTINUED | OUTPATIENT
Start: 2022-08-29 | End: 2022-08-31 | Stop reason: HOSPADM

## 2022-08-29 RX ORDER — LIDOCAINE HYDROCHLORIDE 10 MG/ML
INJECTION, SOLUTION INTRAVENOUS
Status: DISCONTINUED | OUTPATIENT
Start: 2022-08-29 | End: 2022-08-29

## 2022-08-29 RX ORDER — FLUTICASONE PROPIONATE 50 MCG
1 SPRAY, SUSPENSION (ML) NASAL 2 TIMES DAILY PRN
COMMUNITY
End: 2022-09-06

## 2022-08-29 RX ORDER — FLUTICASONE PROPIONATE 50 MCG
1 SPRAY, SUSPENSION (ML) NASAL 2 TIMES DAILY PRN
Status: DISCONTINUED | OUTPATIENT
Start: 2022-08-29 | End: 2022-08-31 | Stop reason: HOSPADM

## 2022-08-29 RX ORDER — SODIUM CHLORIDE 9 MG/ML
INJECTION, SOLUTION INTRAVENOUS CONTINUOUS
Status: DISCONTINUED | OUTPATIENT
Start: 2022-08-29 | End: 2022-08-29

## 2022-08-29 RX ORDER — DIPHENHYDRAMINE HCL 25 MG
25 CAPSULE ORAL EVERY 4 HOURS PRN
Status: DISCONTINUED | OUTPATIENT
Start: 2022-08-29 | End: 2022-08-31 | Stop reason: HOSPADM

## 2022-08-29 RX ADMIN — ROCURONIUM BROMIDE 10 MG: 10 INJECTION, SOLUTION INTRAVENOUS at 12:08

## 2022-08-29 RX ADMIN — ONDANSETRON 4 MG: 2 INJECTION INTRAMUSCULAR; INTRAVENOUS at 12:08

## 2022-08-29 RX ADMIN — FENTANYL CITRATE 50 MCG: 50 INJECTION INTRAMUSCULAR; INTRAVENOUS at 11:08

## 2022-08-29 RX ADMIN — PROCHLORPERAZINE EDISYLATE 5 MG: 5 INJECTION INTRAMUSCULAR; INTRAVENOUS at 10:08

## 2022-08-29 RX ADMIN — MORPHINE SULFATE 4 MG: 4 INJECTION, SOLUTION INTRAMUSCULAR; INTRAVENOUS at 01:08

## 2022-08-29 RX ADMIN — LIDOCAINE HYDROCHLORIDE 20 MG: 10 INJECTION, SOLUTION INTRAVENOUS at 11:08

## 2022-08-29 RX ADMIN — Medication 200 MG: at 11:08

## 2022-08-29 RX ADMIN — ALPRAZOLAM 0.5 MG: 0.5 TABLET ORAL at 10:08

## 2022-08-29 RX ADMIN — MIDAZOLAM 2 MG: 1 INJECTION INTRAMUSCULAR; INTRAVENOUS at 11:08

## 2022-08-29 RX ADMIN — OXYCODONE AND ACETAMINOPHEN 1 TABLET: 10; 325 TABLET ORAL at 10:08

## 2022-08-29 RX ADMIN — HYDROMORPHONE HYDROCHLORIDE 1 MG: 1 INJECTION, SOLUTION INTRAMUSCULAR; INTRAVENOUS; SUBCUTANEOUS at 06:08

## 2022-08-29 RX ADMIN — SODIUM CHLORIDE, SODIUM LACTATE, POTASSIUM CHLORIDE, AND CALCIUM CHLORIDE: 600; 310; 30; 20 INJECTION, SOLUTION INTRAVENOUS at 12:08

## 2022-08-29 RX ADMIN — ROCURONIUM BROMIDE 30 MG: 10 INJECTION, SOLUTION INTRAVENOUS at 11:08

## 2022-08-29 RX ADMIN — FENTANYL CITRATE 50 MCG: 50 INJECTION INTRAMUSCULAR; INTRAVENOUS at 01:08

## 2022-08-29 RX ADMIN — DEXAMETHASONE SODIUM PHOSPHATE 4 MG: 4 INJECTION, SOLUTION INTRA-ARTICULAR; INTRALESIONAL; INTRAMUSCULAR; INTRAVENOUS; SOFT TISSUE at 12:08

## 2022-08-29 RX ADMIN — ONDANSETRON 4 MG: 2 INJECTION INTRAMUSCULAR; INTRAVENOUS at 11:08

## 2022-08-29 RX ADMIN — CEFAZOLIN SODIUM 2 G: 2 SOLUTION INTRAVENOUS at 10:08

## 2022-08-29 RX ADMIN — ACETAMINOPHEN 1000 MG: 10 INJECTION, SOLUTION INTRAVENOUS at 12:08

## 2022-08-29 RX ADMIN — FENTANYL CITRATE 50 MCG: 50 INJECTION INTRAMUSCULAR; INTRAVENOUS at 12:08

## 2022-08-29 RX ADMIN — OXYCODONE AND ACETAMINOPHEN 1 TABLET: 10; 325 TABLET ORAL at 04:08

## 2022-08-29 RX ADMIN — GLYCOPYRROLATE 0.6 MG: 0.2 INJECTION, SOLUTION INTRAMUSCULAR; INTRAVITREAL at 01:08

## 2022-08-29 RX ADMIN — FENTANYL CITRATE 100 MCG: 50 INJECTION INTRAMUSCULAR; INTRAVENOUS at 11:08

## 2022-08-29 RX ADMIN — ROCURONIUM BROMIDE 15 MG: 10 INJECTION, SOLUTION INTRAVENOUS at 12:08

## 2022-08-29 RX ADMIN — MUPIROCIN: 20 OINTMENT TOPICAL at 10:08

## 2022-08-29 RX ADMIN — SODIUM CHLORIDE, SODIUM LACTATE, POTASSIUM CHLORIDE, AND CALCIUM CHLORIDE: 600; 310; 30; 20 INJECTION, SOLUTION INTRAVENOUS at 08:08

## 2022-08-29 RX ADMIN — MORPHINE SULFATE 5 MG: 10 INJECTION, SOLUTION INTRAMUSCULAR; INTRAVENOUS at 01:08

## 2022-08-29 RX ADMIN — MORPHINE SULFATE 4 MG: 4 INJECTION, SOLUTION INTRAMUSCULAR; INTRAVENOUS at 02:08

## 2022-08-29 RX ADMIN — MUPIROCIN: 20 OINTMENT TOPICAL at 08:08

## 2022-08-29 RX ADMIN — ONDANSETRON HYDROCHLORIDE 4 MG: 2 SOLUTION INTRAMUSCULAR; INTRAVENOUS at 01:08

## 2022-08-29 RX ADMIN — PROCHLORPERAZINE EDISYLATE 5 MG: 5 INJECTION INTRAMUSCULAR; INTRAVENOUS at 04:08

## 2022-08-29 RX ADMIN — PROCHLORPERAZINE EDISYLATE 5 MG: 5 INJECTION INTRAMUSCULAR; INTRAVENOUS at 02:08

## 2022-08-29 RX ADMIN — Medication 5 MG: at 01:08

## 2022-08-29 NOTE — ANESTHESIA PREPROCEDURE EVALUATION
08/29/2022  Nanda House is a 42 y.o., female.      Pre-op Assessment    I have reviewed the Patient Summary Reports.    I have reviewed the NPO Status.   I have reviewed the Medications.     Review of Systems  Anesthesia Hx:  No problems with previous Anesthesia  Denies Family Hx of Anesthesia complications.   Denies Personal Hx of Anesthesia complications.   Social:  Smoker    Cardiovascular:  Cardiovascular Normal     Pulmonary:  Pulmonary Normal COVID HX   Renal/:  Renal/ Normal     Hepatic/GI:  Hepatic/GI Normal    Neurological:  Neurology Normal    Endocrine:  Endocrine Normal    Dermatological:  Early lupus    Lab Results   Component Value Date    WBC 6.24 08/26/2022    HGB 11.9 (L) 08/26/2022    HCT 36.9 (L) 08/26/2022    MCV 82 08/26/2022     08/26/2022     CMP  Sodium   Date Value Ref Range Status   08/26/2022 139 136 - 145 mmol/L Final     Potassium   Date Value Ref Range Status   08/26/2022 4.0 3.5 - 5.1 mmol/L Final     Chloride   Date Value Ref Range Status   08/26/2022 107 95 - 110 mmol/L Final     CO2   Date Value Ref Range Status   08/26/2022 28 23 - 29 mmol/L Final     Glucose   Date Value Ref Range Status   08/26/2022 114 (H) 70 - 110 mg/dL Final     BUN   Date Value Ref Range Status   08/26/2022 9 6 - 20 mg/dL Final     Creatinine   Date Value Ref Range Status   08/26/2022 0.7 0.5 - 1.4 mg/dL Final     Calcium   Date Value Ref Range Status   08/26/2022 8.7 8.7 - 10.5 mg/dL Final     Total Protein   Date Value Ref Range Status   08/26/2022 7.0 6.0 - 8.4 g/dL Final     Albumin   Date Value Ref Range Status   08/26/2022 3.4 (L) 3.5 - 5.2 g/dL Final     Total Bilirubin   Date Value Ref Range Status   08/26/2022 0.3 0.1 - 1.0 mg/dL Final     Comment:     For infants and newborns, interpretation of results should be based  on gestational age, weight and in agreement with  clinical  observations.    Premature Infant recommended reference ranges:  Up to 24 hours.............<8.0 mg/dL  Up to 48 hours............<12.0 mg/dL  3-5 days..................<15.0 mg/dL  6-29 days.................<15.0 mg/dL    For patients on Eltrombopag therapy, use of Dimension [place] TBIL is   not   recommended.       Alkaline Phosphatase   Date Value Ref Range Status   08/26/2022 98 55 - 135 U/L Final     AST   Date Value Ref Range Status   08/26/2022 19 10 - 40 U/L Final     ALT   Date Value Ref Range Status   08/26/2022 26 10 - 44 U/L Final     Anion Gap   Date Value Ref Range Status   08/26/2022 4 (L) 8 - 16 mmol/L Final     Estimated GFR-Non    Date Value Ref Range Status   12/04/2018 >60 mL/min/1.73 m2 Final          Anesthesia Plan  Type of Anesthesia, risks & benefits discussed:    Anesthesia Type: Gen ETT  Intra-op Monitoring Plan: Standard ASA Monitors  Post Op Pain Control Plan: multimodal analgesia  Induction:  IV  Airway Plan: Direct  Informed Consent: Informed consent signed with the Patient and all parties understand the risks and agree with anesthesia plan.  All questions answered.   ASA Score: 2  Day of Surgery Review of History & Physical: I have interviewed and examined the patient. I have reviewed the patient's H&P dated: There are no significant changes.     Ready For Surgery From Anesthesia Perspective.     .

## 2022-08-29 NOTE — INTERVAL H&P NOTE
The patient has been examined and the H&P has been reviewed:    I concur with the findings and no changes have occurred since H&P was written.    Surgery risks, benefits and alternative options discussed and understood by patient/family.          Active Hospital Problems    Diagnosis  POA    *Fibroids [D21.9]  Yes     Priority: 1 - High    Thickened endometrium [R93.89]  Yes     Priority: 2     Menorrhagia with regular cycle [N92.0]  Yes     Priority: 2     Pelvic pain [R10.2]  Yes     Priority: 3     Anxiety [F41.9]  Yes     Priority: 5     Lupus [M32.9]  Yes     Priority: 8     History of  [Z98.891]  Not Applicable     Priority: 15       Resolved Hospital Problems   No resolved problems to display.

## 2022-08-29 NOTE — NURSING
Pt arrived to unit from surgery in bed. Pt resting, easily aroused, falls back to sleep. Placed on 2 L NC. Vitals WNL Will continue to monitor.

## 2022-08-29 NOTE — TRANSFER OF CARE
Anesthesia Transfer of Care Note    Patient: Nanda House    Procedure(s) Performed: Procedure(s) (LRB):  HYSTERECTOMY, TOTAL, ABDOMINAL  AND BILATERAL SALPINGECTOMY (N/A)    Patient location: PACU    Anesthesia Type: general    Transport from OR: Transported from OR on room air with adequate spontaneous ventilation    Post pain: adequate analgesia    Post assessment: no apparent anesthetic complications    Post vital signs: stable    Level of consciousness: awake    Nausea/Vomiting: no nausea/vomiting    Complications: none    Transfer of care protocol was followed      Last vitals:   140/87  16 RR  37 C TEMP  100 HR  100% O 2SAT

## 2022-08-29 NOTE — OP NOTE
Surgery Date: 8/29/2022     Surgeon(s) and Role:     * Lorenza Ryder MD - Primary    Pre-op Diagnosis:    Pelvic pain [R10.2]  Uterine leiomyoma, unspecified location [D25.9]  Lupus [M32.9]  Thickened endometrium [R93.89]  Menorrhagia with irregular cycle [N92.1]    Post-op Diagnosis:    Pelvic pain [R10.2]  Uterine leiomyoma, unspecified location [D25.9]  Lupus [M32.9]  Thickened endometrium [R93.89]  Menorrhagia with irregular cycle [N92.1]  Endometriosis   Dense adhesions of bladder to lower uterine segment  Omental adhesions to anterior abdominal wall    Procedure(s):  Procedure(s):  HYSTERECTOMY, TOTAL, ABDOMINAL  AND BILATERAL SALPINGECTOMY    Specimens (From admission, onward)       Start     Ordered    08/29/22 1309  Specimen to Pathology, Surgery Gynecology and Obstetrics  Once        Comments: Pre-op Diagnosis: Pelvic pain [R10.2]Uterine leiomyoma, unspecified location [D25.9]Lupus [M32.9]Thickened endometrium [R93.89]Menorrhagia with irregular cycle [N92.1]Procedure(s):HYSTERECTOMY, TOTAL, ABDOMINAL Number of specimens  1Name of specimens:  1) UTERUS, CERVIX AND BILATERAL FALLOPIAN TUBES @  1230     References:    Click here for ordering Quick Tip   Question Answer Comment   Procedure Type: Gynecology and Obstetrics    Specimen Class: Complex case/Special    Which provider would you like to cc? LORENZA RYDER    Release to patient Immediate        08/29/22 1308                    Anesthesia: General    EBL: 250 mL  IV fluids: 3 liters  UOP: 110 cc      PROCEDURE IN DETAIL:  The patient was placed upon the operating table in the dorsal supine position.  She was given a satisfactory endotracheal anesthesia.  The abdomen was prepped with Chloroprep solution and the vagina was prepped with betadine solution.  The patient was draped in the usual manner for a low abdomen skin incision.  After assuring adequate anesthesia, the abdomen was then opened in layers through a Pfannenstiel incision.  Hemostasis was  achieved by the use of the Bovie.  Upon entering the abdomen, there were omental adhesions to the anterior abdominal wall.  These were taken down with the Bovie cautery.  The OKyree-OJefferson retractor was placed in the abdomen.  The upper abdomen was explored and palpated normal. The bowels were packed free of the pelvis with moistened laparotomy drapes.    The pelvis was inspected. The uterus appeared enlarged, c/w 14 week size.  The ovaries appeared normal bilaterally, but had evidence of endometriosis.. The pelvic peritoneum appeared Normal. The upper abdomen was inspected and appeared Normal.      At this time, hysterectomy was begun in the usual fashion.  the uterus was elevated into the operative field using a Sanjuana clamps.  The right fallopian tube was excised using LigaSure.  There were issues with visibility of the uterine arteries so vasopressin was injected into the uterus and a wedge shaped portion of the uterus was excised with the scalpel.  The right round ligament was ligated and divided with the LigaSure.  The anterior leaf of the broad ligament was incised with Bovie cautery to create the bladder flap.  Dense adhesions were noted between the bladder and the lower uterine segment.  The right utero-ovarian ligament was then ligated and divided with the LigaSure.  The right broad ligament was also ligated and divided with the LigaSure.  The cardinal ligament/uterine artery complex were then ligated divided with the LigaSure.    Attention was turned to the opposite side where the left fallopian tube was excised using LigaSure.  The left round and utero-ovarian ligaments were also ligated and divided with LigaSure.  The left broad ligament was then ligated divided with the LigaSure.  The remainder of the bladder flap was created using the Bovie cautery with extra attention paid to dissection as the bladder was densely adhered to the lower uterine segment.  The left cardinal ligament/uterine artery  complex was then ligated and divided with the LigaSure.The uterus was amputated from the cervix using Bovie cautery to allow for better visualization for removal of the cervix.    The uterosacral ligament was next grasped with straight parametrium clamps, divided with scissors and ligated with 0 Vicryl in a transfixion manner.  This suture was tagged and saved for later incorporation into the vaginal angles.  Right angle parametrium clamps were then placed below the cervix and the uterus and cervix removed with Aurora scissors.  The vaginal cuff angles were then sutured by means of the 0 Vicryl suture which had been tagged to the uterosacral ligaments.  The remainder of the vaginal cuff was closed with a 0 Vicryl in a running locked fashion.  At this time, there was no bleeding noted.     The pelvis was irrigated with saline until clear.  All pedicles were inspected.  There was no bleeding noted.  The laps were removed from the abdomen.  The bowels were placed back into the abdomen in anatomic position.      The peritoneum and rectus were then closed with a 0 chromic simple running suture.  The fascia was then closed with a 0 Vicryl simple running suture begun at the left angle and tied at the right angle. The skin was closed with 3-0 Monocryl subcuticular stitch.  All layers were irrigated with saline prior to closure. The patient tolerated the procedure well. She was awake and alert leaving the operating room with vital signs stable.  All counts correct at the beginning and end of procedure

## 2022-08-29 NOTE — ANESTHESIA PROCEDURE NOTES
Intubation    Date/Time: 8/29/2022 11:29 AM  Performed by: Long Royal CRNA  Authorized by: Long Royal CRNA     Intubation:     Induction:  Intravenous    Intubated:  Postinduction    Mask Ventilation:  Easy mask    Attempts:  1    Attempted By:  CRNA    Method of Intubation:  Direct and bougie    Blade:  Codie 4    Laryngeal View Grade: Grade I - full view of cords      Difficult Airway Encountered?: No      Complications:  None    Airway Device:  Oral endotracheal tube    Airway Device Size:  7.0    Style/Cuff Inflation:  Cuffed    Inflation Amount (mL):  6    Tube secured:  21    Secured at:  The lips    Placement Verified By:  Capnometry    Complicating Factors:  None    Findings Post-Intubation:  BS equal bilateral and atraumatic/condition of teeth unchanged

## 2022-08-30 LAB
ALBUMIN SERPL BCP-MCNC: 3 G/DL (ref 3.5–5.2)
ALP SERPL-CCNC: 93 U/L (ref 55–135)
ALT SERPL W/O P-5'-P-CCNC: 27 U/L (ref 10–44)
ANION GAP SERPL CALC-SCNC: 7 MMOL/L (ref 8–16)
AST SERPL-CCNC: 19 U/L (ref 10–40)
BASOPHILS # BLD AUTO: 0.02 K/UL (ref 0–0.2)
BASOPHILS NFR BLD: 0.2 % (ref 0–1.9)
BILIRUB SERPL-MCNC: 0.5 MG/DL (ref 0.1–1)
BUN SERPL-MCNC: 6 MG/DL (ref 6–20)
CALCIUM SERPL-MCNC: 8.5 MG/DL (ref 8.7–10.5)
CHLORIDE SERPL-SCNC: 103 MMOL/L (ref 95–110)
CO2 SERPL-SCNC: 26 MMOL/L (ref 23–29)
CREAT SERPL-MCNC: 0.6 MG/DL (ref 0.5–1.4)
DIFFERENTIAL METHOD: ABNORMAL
EOSINOPHIL # BLD AUTO: 0 K/UL (ref 0–0.5)
EOSINOPHIL NFR BLD: 0.1 % (ref 0–8)
ERYTHROCYTE [DISTWIDTH] IN BLOOD BY AUTOMATED COUNT: 13.4 % (ref 11.5–14.5)
EST. GFR  (NO RACE VARIABLE): >60 ML/MIN/1.73 M^2
GLUCOSE SERPL-MCNC: 131 MG/DL (ref 70–110)
HCT VFR BLD AUTO: 35.9 % (ref 37–48.5)
HGB BLD-MCNC: 11.8 G/DL (ref 12–16)
IMM GRANULOCYTES # BLD AUTO: 0.04 K/UL (ref 0–0.04)
IMM GRANULOCYTES NFR BLD AUTO: 0.3 % (ref 0–0.5)
LYMPHOCYTES # BLD AUTO: 2.1 K/UL (ref 1–4.8)
LYMPHOCYTES NFR BLD: 17.3 % (ref 18–48)
MCH RBC QN AUTO: 26.9 PG (ref 27–31)
MCHC RBC AUTO-ENTMCNC: 32.9 G/DL (ref 32–36)
MCV RBC AUTO: 82 FL (ref 82–98)
MONOCYTES # BLD AUTO: 1.1 K/UL (ref 0.3–1)
MONOCYTES NFR BLD: 8.8 % (ref 4–15)
NEUTROPHILS # BLD AUTO: 9 K/UL (ref 1.8–7.7)
NEUTROPHILS NFR BLD: 73.3 % (ref 38–73)
NRBC BLD-RTO: 0 /100 WBC
PLATELET # BLD AUTO: 333 K/UL (ref 150–450)
PMV BLD AUTO: 9.4 FL (ref 9.2–12.9)
POTASSIUM SERPL-SCNC: 3.9 MMOL/L (ref 3.5–5.1)
PROT SERPL-MCNC: 6.6 G/DL (ref 6–8.4)
RBC # BLD AUTO: 4.38 M/UL (ref 4–5.4)
SODIUM SERPL-SCNC: 136 MMOL/L (ref 136–145)
WBC # BLD AUTO: 12.33 K/UL (ref 3.9–12.7)

## 2022-08-30 PROCEDURE — 99233 PR SUBSEQUENT HOSPITAL CARE,LEVL III: ICD-10-PCS | Mod: ,,, | Performed by: OBSTETRICS & GYNECOLOGY

## 2022-08-30 PROCEDURE — 94799 UNLISTED PULMONARY SVC/PX: CPT

## 2022-08-30 PROCEDURE — 63600175 PHARM REV CODE 636 W HCPCS: Performed by: OBSTETRICS & GYNECOLOGY

## 2022-08-30 PROCEDURE — 99900035 HC TECH TIME PER 15 MIN (STAT)

## 2022-08-30 PROCEDURE — 36415 COLL VENOUS BLD VENIPUNCTURE: CPT | Performed by: OBSTETRICS & GYNECOLOGY

## 2022-08-30 PROCEDURE — 27000221 HC OXYGEN, UP TO 24 HOURS

## 2022-08-30 PROCEDURE — 94761 N-INVAS EAR/PLS OXIMETRY MLT: CPT

## 2022-08-30 PROCEDURE — 25000003 PHARM REV CODE 250: Performed by: OBSTETRICS & GYNECOLOGY

## 2022-08-30 PROCEDURE — 11000001 HC ACUTE MED/SURG PRIVATE ROOM

## 2022-08-30 PROCEDURE — 99233 SBSQ HOSP IP/OBS HIGH 50: CPT | Mod: ,,, | Performed by: OBSTETRICS & GYNECOLOGY

## 2022-08-30 PROCEDURE — 85025 COMPLETE CBC W/AUTO DIFF WBC: CPT | Performed by: OBSTETRICS & GYNECOLOGY

## 2022-08-30 PROCEDURE — 99900031 HC PATIENT EDUCATION (STAT)

## 2022-08-30 PROCEDURE — 80053 COMPREHEN METABOLIC PANEL: CPT | Performed by: OBSTETRICS & GYNECOLOGY

## 2022-08-30 RX ADMIN — ALPRAZOLAM 0.5 MG: 0.5 TABLET ORAL at 08:08

## 2022-08-30 RX ADMIN — HYDROMORPHONE HYDROCHLORIDE 1 MG: 1 INJECTION, SOLUTION INTRAMUSCULAR; INTRAVENOUS; SUBCUTANEOUS at 08:08

## 2022-08-30 RX ADMIN — IBUPROFEN 600 MG: 600 TABLET ORAL at 04:08

## 2022-08-30 RX ADMIN — HYDROMORPHONE HYDROCHLORIDE 1 MG: 1 INJECTION, SOLUTION INTRAMUSCULAR; INTRAVENOUS; SUBCUTANEOUS at 04:08

## 2022-08-30 RX ADMIN — PROCHLORPERAZINE EDISYLATE 5 MG: 5 INJECTION INTRAMUSCULAR; INTRAVENOUS at 04:08

## 2022-08-30 RX ADMIN — MUPIROCIN: 20 OINTMENT TOPICAL at 09:08

## 2022-08-30 RX ADMIN — MUPIROCIN: 20 OINTMENT TOPICAL at 08:08

## 2022-08-30 RX ADMIN — PROCHLORPERAZINE EDISYLATE 5 MG: 5 INJECTION INTRAMUSCULAR; INTRAVENOUS at 08:08

## 2022-08-30 RX ADMIN — ONDANSETRON 8 MG: 4 TABLET, ORALLY DISINTEGRATING ORAL at 09:08

## 2022-08-30 RX ADMIN — OXYCODONE AND ACETAMINOPHEN 1 TABLET: 10; 325 TABLET ORAL at 08:08

## 2022-08-30 NOTE — PLAN OF CARE
[facility]  Initial Discharge Assessment       Primary Care Provider: Sanjuana Guzmán NP    Admission Diagnosis: Pelvic pain [R10.2]  Uterine leiomyoma, unspecified location [D25.9]  Lupus [M32.9]  Thickened endometrium [R93.89]  Menorrhagia with irregular cycle [N92.1]  Fibroids [D21.9]    Admission Date: 8/29/2022  Expected Discharge Date:     Discharge Barriers Identified: None    Payor: MEDICAID / [facility] Product Type: Managed Medicaid /     Extended Emergency Contact Information  Primary Emergency Contact: Lg Cote  Mobile Phone: [phone]  Relation: Friend  Preferred language: English    Discharge Plan A: Home  Discharge Plan B: Home      [id number] - [place], [place] - [address]  [address] [place] [address]  Phone: [phone] Fax: [phone]      Initial Assessment (most recent)       Adult Discharge Assessment - 08/30/22 0843          Discharge Assessment    Assessment Type Discharge Planning Assessment     Confirmed/corrected address, phone number and insurance Yes     Confirmed Demographics Correct on Facesheet     Source of Information patient     Reason For Admission Fibroids     Lives With child(arline), dependent;friend(s);other (see comments)   The patient lives with her son, friend, and her friend's family.    Do you expect to return to your current living situation? Yes     Do you have help at home or someone to help you manage your care at home? --   The patient is independent, but her friend, Lg, is able to assist her if needed.    Prior to hospitilization cognitive status: Alert/Oriented     Current cognitive status: Alert/Oriented   The patient was in pain, but she was able to answer my questions, as well as her friend.    Walking or Climbing Stairs Difficulty none     Dressing/Bathing Difficulty none     Home Layout Able to live on 1st floor     Equipment Currently Used at Home none     Readmission within 30 days? No     Patient  currently being followed by outpatient case management? No     Do you currently have service(s) that help you manage your care at home? No     Do you take prescription medications? Yes     Do you have prescription coverage? Yes     Coverage MEDICAID - [facility]/self-pay     Do you have any problems affording any of your prescribed medications? No     Is the patient taking medications as prescribed? yes     Who is going to help you get home at discharge? Lg (Friend)   [phone]     How do you get to doctors appointments? family or friend will provide;car, drives self     Are you on dialysis? No     Do you take coumadin? No     Discharge Plan A Home     Discharge Plan B Home     Discharge Plan discussed with: Patient;Friend     Name(s) and Number(s) Lg (Friend)   [phone]     Discharge Barriers Identified None        Physical Activity    On average, how many days per week do you engage in moderate to strenuous exercise (like a brisk walk)? 0 days     On average, how many minutes do you engage in exercise at this level? 0 min        Financial Resource Strain    How hard is it for you to pay for the very basics like food, housing, medical care, and heating? Not hard at all   The patient stated that she has foodstamps, and she is in the process of applying for disability.       Housing Stability    In the last 12 months, was there a time when you were not able to pay the mortgage or rent on time? No     In the last 12 months, was there a time when you did not have a steady place to sleep or slept in a shelter (including now)? No        Transportation Needs    In the past 12 months, has lack of transportation kept you from medical appointments or from getting medications? No     In the past 12 months, has lack of transportation kept you from meetings, work, or from getting things needed for daily living? No        Food Insecurity    Within the past 12 months, you worried that your food  would run out before you got the money to buy more. Never true     Within the past 12 months, the food you bought just didn't last and you didn't have money to get more. Never true        Stress    Do you feel stress - tense, restless, nervous, or anxious, or unable to sleep at night because your mind is troubled all the time - these days? Very much   Attempted to offer the patient resources, but she denied.       Social Connections    In a typical week, how many times do you talk on the phone with family, friends, or neighbors? More than three times a week   The patient lives with her friend and her friend's family. They are her support system.    How often do you get together with friends or relatives? More than three times a week     How often do you attend Alevism or Mosque services? Never     Do you belong to any clubs or organizations such as Alevism groups, unions, fraternal or athletic groups, or school groups? No     How often do you attend meetings of the clubs or organizations you belong to? Never     Are you , , , , never , or living with a partner?         Alcohol Use    Q1: How often do you have a drink containing alcohol? Never     Q2: How many drinks containing alcohol do you have on a typical day when you are drinking? Patient does not drink     Q3: How often do you have six or more drinks on one occasion? Never                 Initial discharge assessment is completed. The assessment was done with the patient and her friend, Lg. Lg was present at the patient's bedside. The patient lives with Lg and her family. She also has a son who lives with them as well. The patient is able to complete her ADLs without assistance, but Lg is able to help her as needed. The patient will be returning home with Lg upon discharge. At this time, she does not require any assistance from case management. Contact information was left in the patient's room if she  had any questions or concerns. Will continue to monitor.

## 2022-08-30 NOTE — PROGRESS NOTES
Progress Note    1 Day Post-Op   DARION/ bilateral salpingectomy     Patient Active Problem List   Diagnosis    Fibroids    Pelvic pain    Lupus    Thickened endometrium    Menorrhagia with regular cycle    History of     Anxiety       Nanda reports that she is feeling fairly well postoperatively. She is  tolerating clears but doesn't have much appetite. She reports nausea and was given Compazine.  She then had an episode where she felt like her heart was racing after receiving medication.  Heart rate was about 110. She is not passing flatus.  She reports that pain is well controlled with pain meds. Bass has been discontinued.    Vitals:    22 1113   BP: 127/61   Pulse: 104   Resp: 20   Temp: 98.1 °F (36.7 °C)         Temp Range:  Temp (24hrs), Av.5 °F (36.4 °C), Min:96.7 °F (35.9 °C), Max:98.3 °F (36.8 °C)       Exam:    Lungs: clear to auscultation, no wheezes, rales or rhonchi, symmetric air entry    Cardiac: normal sinus rhythm    Abd: soft, bowel sounds active, non-tender. Abdominal binder in place    Incision: Healing well. No erythema, significant drainage. Sutures intact.    Ext: no cyanosis, clubbing, edema           Recent labs:     WBC   Date Value Ref Range Status   2022 12.33 3.90 - 12.70 K/uL Final     Hemoglobin   Date Value Ref Range Status   2022 11.8 (L) 12.0 - 16.0 g/dL Final     Hematocrit   Date Value Ref Range Status   2022 35.9 (L) 37.0 - 48.5 % Final         I/O last 3 completed shifts:  In: 3833.3 [P.O.:640; I.V.:3193.3]  Out: 2210 [Urine:1960; Blood:250]  No intake/output data recorded.        Assessment:  Postop day 1 status post total abdominal hysterectomy with bilateral salpingectomy     Plan: Activity: out of bed and ambulate   Diet: General/Regular   Medications: discontinue:  Compazine       Amira Ryder MD, FACOG

## 2022-08-31 VITALS
RESPIRATION RATE: 18 BRPM | BODY MASS INDEX: 33.27 KG/M2 | TEMPERATURE: 98 F | DIASTOLIC BLOOD PRESSURE: 57 MMHG | OXYGEN SATURATION: 96 % | HEART RATE: 93 BPM | SYSTOLIC BLOOD PRESSURE: 103 MMHG | WEIGHT: 207 LBS | HEIGHT: 66 IN

## 2022-08-31 PROCEDURE — 99239 PR HOSPITAL DISCHARGE DAY,>30 MIN: ICD-10-PCS | Mod: ,,, | Performed by: OBSTETRICS & GYNECOLOGY

## 2022-08-31 PROCEDURE — 25000003 PHARM REV CODE 250: Performed by: OBSTETRICS & GYNECOLOGY

## 2022-08-31 PROCEDURE — 94799 UNLISTED PULMONARY SVC/PX: CPT

## 2022-08-31 PROCEDURE — 94761 N-INVAS EAR/PLS OXIMETRY MLT: CPT

## 2022-08-31 PROCEDURE — 99239 HOSP IP/OBS DSCHRG MGMT >30: CPT | Mod: ,,, | Performed by: OBSTETRICS & GYNECOLOGY

## 2022-08-31 PROCEDURE — 99900035 HC TECH TIME PER 15 MIN (STAT)

## 2022-08-31 PROCEDURE — 99900031 HC PATIENT EDUCATION (STAT)

## 2022-08-31 RX ORDER — IBUPROFEN 600 MG/1
600 TABLET ORAL EVERY 6 HOURS
Qty: 40 TABLET | Refills: 1 | Status: SHIPPED | OUTPATIENT
Start: 2022-08-31 | End: 2022-09-06 | Stop reason: SDUPTHER

## 2022-08-31 RX ORDER — ONDANSETRON 4 MG/1
4 TABLET, ORALLY DISINTEGRATING ORAL EVERY 8 HOURS PRN
Qty: 30 TABLET | Refills: 0 | Status: SHIPPED | OUTPATIENT
Start: 2022-08-31 | End: 2023-01-19 | Stop reason: SDUPTHER

## 2022-08-31 RX ORDER — OXYCODONE AND ACETAMINOPHEN 5; 325 MG/1; MG/1
1 TABLET ORAL EVERY 6 HOURS PRN
Qty: 28 TABLET | Refills: 0 | Status: SHIPPED | OUTPATIENT
Start: 2022-08-31 | End: 2022-09-06 | Stop reason: DRUGHIGH

## 2022-08-31 RX ADMIN — IBUPROFEN 600 MG: 600 TABLET ORAL at 12:08

## 2022-08-31 RX ADMIN — OXYCODONE AND ACETAMINOPHEN 1 TABLET: 10; 325 TABLET ORAL at 10:08

## 2022-08-31 RX ADMIN — ALPRAZOLAM 0.5 MG: 0.5 TABLET ORAL at 09:08

## 2022-08-31 RX ADMIN — MUPIROCIN: 20 OINTMENT TOPICAL at 09:08

## 2022-08-31 RX ADMIN — IBUPROFEN 600 MG: 600 TABLET ORAL at 05:08

## 2022-08-31 RX ADMIN — OXYCODONE AND ACETAMINOPHEN 1 TABLET: 10; 325 TABLET ORAL at 01:08

## 2022-08-31 NOTE — DISCHARGE SUMMARY
Discharge Summary:  2022    Admit Date: 2022    Discharge Date: 2022    Attending Physician: Amira Ryder M.D., FACOG     Reason for Admission: Fibroids    Patient Active Problem List    Diagnosis Date Noted    Fibroids 2022    Thickened endometrium 2022    Menorrhagia with regular cycle 2022    Pelvic pain 2022    Anxiety 2022    Lupus 2022    History of  2022       Hospital Course: Hospital Day: 3    2 Days Post-OpProcedure(s) (LRB):  HYSTERECTOMY, TOTAL, ABDOMINAL  AND BILATERAL SALPINGECTOMY (N/A)     Feeling well. Ambulating without problems. Tolerating regular diet. Voiding without difficulty and passing flatus. Vital signs stable. She has been afebrile postoperatively. No vaginal bleeding or discharge.  She desires to go home. Requesting nausea meds with her pain meds to go home.      Physical Exam:   Chest: Clear to auscultation   Cardiovascular: Regular rate and Rhythm. No tachycardia   Abd: soft, bowel sounds active, non-tender    Incision: Healing well. No erythema, significant drainage. Sutures intact.     Ext: no cyanosis, clubbing, edema    Procedures Performed:  Procedure(s) (LRB):  HYSTERECTOMY, TOTAL, ABDOMINAL  AND BILATERAL SALPINGECTOMY (N/A)     Discharge Diagnosis:   1. Menorrhagia with irregular cycle    2. Uterine leiomyoma, unspecified location    3. Fibroids        Condition on Discharge: stable    Discharge Activity: ambulate in house, no driving for 2 weeks, no sex for 6 weeks, no driving while on analgesics, and no heavy lifting for 6 weeks    Patient Instructions:  Current Discharge Medication List        START taking these medications    Details   ibuprofen (ADVIL,MOTRIN) 600 MG tablet Take 1 tablet (600 mg total) by mouth every 6 (six) hours.  Qty: 40 tablet, Refills: 1      ondansetron (ZOFRAN-ODT) 4 MG TbDL Take 1 tablet (4 mg total) by mouth every 8 (eight) hours as needed (nausea).  Qty: 30 tablet, Refills:  0      oxyCODONE-acetaminophen (PERCOCET) 5-325 mg per tablet Take 1 tablet by mouth every 6 (six) hours as needed for Pain.  Qty: 28 tablet, Refills: 0    Comments: Quantity prescribed more than 7 day supply? No           CONTINUE these medications which have NOT CHANGED    Details   ALPRAZolam (XANAX) 0.5 MG tablet Take 0.5 mg by mouth 2 (two) times daily.      fluticasone propionate (FLONASE) 50 mcg/actuation nasal spray 1 spray by Each Nostril route 2 (two) times daily as needed for Rhinitis.      NON FORMULARY MEDICATION Take 200 mg by mouth 2 (two) times a day.           STOP taking these medications       HYDROcodone-acetaminophen (NORCO) 5-325 mg per tablet Comments:   Reason for Stopping:         diphenhydrAMINE (BENADRYL) 50 MG capsule Comments:   Reason for Stopping:               Discharge Procedure Orders   X-Ray Chest PA And Lateral   Standing Status: Future Number of Occurrences: 1 Standing Exp. Date: 08/26/23     Order Specific Question Answer Comments   May the Radiologist modify the order per protocol to meet the clinical needs of the patient? Yes    Release to patient Immediate      CBC Auto Differential   Standing Status: Future Number of Occurrences: 1 Standing Exp. Date: 10/25/23     Comprehensive Metabolic Panel   Standing Status: Future Number of Occurrences: 1 Standing Exp. Date: 10/25/23     Diet Adult Regular     Pelvic Rest     Lifting restrictions     Notify your health care provider if you experience any of the following:  temperature >100.4     Notify your health care provider if you experience any of the following:  persistent nausea and vomiting or diarrhea     Notify your health care provider if you experience any of the following:  severe uncontrolled pain     Notify your health care provider if you experience any of the following:  redness, tenderness, or signs of infection (pain, swelling, redness, odor or green/yellow discharge around incision site)     Notify your health care  provider if you experience any of the following:  difficulty breathing or increased cough     Notify your health care provider if you experience any of the following:  severe persistent headache     Notify your health care provider if you experience any of the following:  persistent dizziness, light-headedness, or visual disturbances      Remove dressing in 72 hours     EKG 12-lead   Standing Status: Future Number of Occurrences: 1 Standing Exp. Date: 08/26/23     Type And Screen Preop   Standing Status: Future Number of Occurrences: 1 Standing Exp. Date: 10/25/23        Follow-up Information       Amira Ryder MD Follow up in 2 week(s).    Specialty: Obstetrics and Gynecology  Why: follow up, wound check  Contact information:  [address] [place] [id number]  [phone]                               Amira Ryder MD FACOG    08/31/2022  8:21 AM

## 2022-08-31 NOTE — PLAN OF CARE
Plan of care discussed with pt, pt verbalized understanding. Pt continues with c/o abdominal incisional pain, states relieved with pain medication. Small amount of blood noted to silver dressing to abdomen. Pt continues to intermittently ambulate hallways as instructed. Will continue to monitor.

## 2022-09-01 LAB — SPECIMEN TO PATHOLOGY - SURGICAL: NORMAL

## 2022-09-06 ENCOUNTER — HOSPITAL ENCOUNTER (EMERGENCY)
Facility: HOSPITAL | Age: 42
Discharge: HOME | End: 2022-09-07
Attending: EMERGENCY MEDICINE
Payer: MEDICAID

## 2022-09-06 ENCOUNTER — TELEPHONE (OUTPATIENT)
Dept: OBSTETRICS AND GYNECOLOGY | Facility: CLINIC | Age: 42
End: 2022-09-06
Payer: MEDICAID

## 2022-09-06 DIAGNOSIS — R10.30 LOWER ABDOMINAL PAIN: Primary | ICD-10-CM

## 2022-09-06 DIAGNOSIS — Z90.710 STATUS POST ABDOMINAL HYSTERECTOMY: ICD-10-CM

## 2022-09-06 DIAGNOSIS — R39.11 URINARY HESITANCY: ICD-10-CM

## 2022-09-06 DIAGNOSIS — K59.00 CONSTIPATION, UNSPECIFIED CONSTIPATION TYPE: ICD-10-CM

## 2022-09-06 DIAGNOSIS — R50.9 FEVER, UNSPECIFIED FEVER CAUSE: ICD-10-CM

## 2022-09-06 DIAGNOSIS — R30.0 DYSURIA: ICD-10-CM

## 2022-09-06 DIAGNOSIS — G89.18 ACUTE POSTOPERATIVE PAIN: Primary | ICD-10-CM

## 2022-09-06 LAB
ALBUMIN SERPL-MCNC: 3.4 GM/DL (ref 3.5–5)
ALBUMIN/GLOB SERPL: 1.1 RATIO (ref 1.1–2)
ALP SERPL-CCNC: 102 UNIT/L (ref 40–150)
ALT SERPL-CCNC: 33 UNIT/L (ref 0–55)
AST SERPL-CCNC: 28 UNIT/L (ref 5–34)
BASOPHILS # BLD AUTO: 0.04 X10(3)/MCL (ref 0–0.2)
BASOPHILS NFR BLD AUTO: 0.7 %
BILIRUBIN DIRECT+TOT PNL SERPL-MCNC: 0.3 MG/DL
BUN SERPL-MCNC: 4.6 MG/DL (ref 7–18.7)
CALCIUM SERPL-MCNC: 9.2 MG/DL (ref 8.4–10.2)
CHLORIDE SERPL-SCNC: 105 MMOL/L (ref 98–107)
CO2 SERPL-SCNC: 26 MMOL/L (ref 22–29)
CREAT SERPL-MCNC: 0.71 MG/DL (ref 0.55–1.02)
EOSINOPHIL # BLD AUTO: 0.13 X10(3)/MCL (ref 0–0.9)
EOSINOPHIL NFR BLD AUTO: 2.2 %
ERYTHROCYTE [DISTWIDTH] IN BLOOD BY AUTOMATED COUNT: 12.9 % (ref 11.5–17)
GFR SERPLBLD CREATININE-BSD FMLA CKD-EPI: >60 MLS/MIN/1.73/M2
GLOBULIN SER-MCNC: 3.2 GM/DL (ref 2.4–3.5)
GLUCOSE SERPL-MCNC: 94 MG/DL (ref 74–100)
HCT VFR BLD AUTO: 36.6 % (ref 37–47)
HGB BLD-MCNC: 11.3 GM/DL (ref 12–16)
IMM GRANULOCYTES # BLD AUTO: 0.04 X10(3)/MCL (ref 0–0.04)
IMM GRANULOCYTES NFR BLD AUTO: 0.7 %
LACTATE SERPL-SCNC: 1.6 MMOL/L (ref 0.5–2.2)
LIPASE SERPL-CCNC: 15 U/L
LYMPHOCYTES # BLD AUTO: 2.19 X10(3)/MCL (ref 0.6–4.6)
LYMPHOCYTES NFR BLD AUTO: 36.6 %
MCH RBC QN AUTO: 26.5 PG (ref 27–31)
MCHC RBC AUTO-ENTMCNC: 30.9 MG/DL (ref 33–36)
MCV RBC AUTO: 85.9 FL (ref 80–94)
MONOCYTES # BLD AUTO: 0.46 X10(3)/MCL (ref 0.1–1.3)
MONOCYTES NFR BLD AUTO: 7.7 %
NEUTROPHILS # BLD AUTO: 3.1 X10(3)/MCL (ref 2.1–9.2)
NEUTROPHILS NFR BLD AUTO: 52.1 %
NRBC BLD AUTO-RTO: 0 %
PLATELET # BLD AUTO: 367 X10(3)/MCL (ref 130–400)
PMV BLD AUTO: 8.8 FL (ref 7.4–10.4)
POTASSIUM SERPL-SCNC: 4.1 MMOL/L (ref 3.5–5.1)
PROT SERPL-MCNC: 6.6 GM/DL (ref 6.4–8.3)
RBC # BLD AUTO: 4.26 X10(6)/MCL (ref 4.2–5.4)
SODIUM SERPL-SCNC: 138 MMOL/L (ref 136–145)
WBC # SPEC AUTO: 6 X10(3)/MCL (ref 4.5–11.5)

## 2022-09-06 PROCEDURE — 96374 THER/PROPH/DIAG INJ IV PUSH: CPT

## 2022-09-06 PROCEDURE — 87040 BLOOD CULTURE FOR BACTERIA: CPT | Performed by: STUDENT IN AN ORGANIZED HEALTH CARE EDUCATION/TRAINING PROGRAM

## 2022-09-06 PROCEDURE — 99285 EMERGENCY DEPT VISIT HI MDM: CPT | Mod: 25

## 2022-09-06 PROCEDURE — 83605 ASSAY OF LACTIC ACID: CPT | Performed by: STUDENT IN AN ORGANIZED HEALTH CARE EDUCATION/TRAINING PROGRAM

## 2022-09-06 PROCEDURE — 80053 COMPREHEN METABOLIC PANEL: CPT | Performed by: STUDENT IN AN ORGANIZED HEALTH CARE EDUCATION/TRAINING PROGRAM

## 2022-09-06 PROCEDURE — 83690 ASSAY OF LIPASE: CPT | Performed by: STUDENT IN AN ORGANIZED HEALTH CARE EDUCATION/TRAINING PROGRAM

## 2022-09-06 PROCEDURE — 85025 COMPLETE CBC W/AUTO DIFF WBC: CPT | Performed by: STUDENT IN AN ORGANIZED HEALTH CARE EDUCATION/TRAINING PROGRAM

## 2022-09-06 PROCEDURE — 36415 COLL VENOUS BLD VENIPUNCTURE: CPT | Performed by: STUDENT IN AN ORGANIZED HEALTH CARE EDUCATION/TRAINING PROGRAM

## 2022-09-06 RX ORDER — NITROFURANTOIN 25; 75 MG/1; MG/1
100 CAPSULE ORAL 2 TIMES DAILY
Qty: 14 CAPSULE | Refills: 0 | Status: SHIPPED | OUTPATIENT
Start: 2022-09-06 | End: 2022-09-07 | Stop reason: ALTCHOICE

## 2022-09-06 RX ORDER — OXYCODONE AND ACETAMINOPHEN 10; 325 MG/1; MG/1
1 TABLET ORAL EVERY 6 HOURS PRN
Qty: 20 TABLET | Refills: 0 | Status: SHIPPED | OUTPATIENT
Start: 2022-09-06 | End: 2022-11-21 | Stop reason: ALTCHOICE

## 2022-09-06 RX ORDER — SODIUM CHLORIDE 9 MG/ML
INJECTION, SOLUTION INTRAVENOUS CONTINUOUS
Status: DISCONTINUED | OUTPATIENT
Start: 2022-09-06 | End: 2022-09-07 | Stop reason: HOSPADM

## 2022-09-06 RX ORDER — IBUPROFEN 600 MG/1
600 TABLET ORAL EVERY 6 HOURS
Qty: 40 TABLET | Refills: 1 | Status: SHIPPED | OUTPATIENT
Start: 2022-09-06 | End: 2022-09-07 | Stop reason: ALTCHOICE

## 2022-09-06 RX ORDER — SODIUM CHLORIDE 0.9 % (FLUSH) 0.9 %
10 SYRINGE (ML) INJECTION
Status: DISCONTINUED | OUTPATIENT
Start: 2022-09-06 | End: 2022-09-07 | Stop reason: HOSPADM

## 2022-09-06 RX ORDER — DOCUSATE SODIUM 100 MG/1
100 CAPSULE, LIQUID FILLED ORAL 2 TIMES DAILY
Qty: 60 CAPSULE | Refills: 1 | Status: SHIPPED | OUTPATIENT
Start: 2022-09-06 | End: 2023-01-03

## 2022-09-06 NOTE — TELEPHONE ENCOUNTER
Will refill pain meds and change dose to prevent too much acetaminophen intake. Refill of motrin sent to help with inflammation associated with postop pain. Colace sent for constipation and encourage to decrease use of zofran which can cause constipation. Also sent macrobid for dysuria. Keep appt as scheduled and call us or report to ER if any other issues

## 2022-09-06 NOTE — TELEPHONE ENCOUNTER
-pain meds not working, hospital told her to double    -feels as though she has ppd/mood swings. Feeling sad    -hard to urinate and has a pain up to her belly button when she does    -colace for constipation?    Post op appt on 09/13    [facility], [place]

## 2022-09-07 VITALS
BODY MASS INDEX: 33.27 KG/M2 | DIASTOLIC BLOOD PRESSURE: 80 MMHG | TEMPERATURE: 100 F | WEIGHT: 207 LBS | SYSTOLIC BLOOD PRESSURE: 144 MMHG | HEART RATE: 85 BPM | OXYGEN SATURATION: 100 % | HEIGHT: 66 IN | RESPIRATION RATE: 17 BRPM

## 2022-09-07 LAB
APPEARANCE UR: ABNORMAL
BACTERIA #/AREA URNS AUTO: ABNORMAL /HPF
BILIRUB UR QL STRIP.AUTO: NEGATIVE MG/DL
COLOR UR AUTO: YELLOW
GLUCOSE UR QL STRIP.AUTO: NEGATIVE MG/DL
KETONES UR QL STRIP.AUTO: NEGATIVE MG/DL
LEUKOCYTE ESTERASE UR QL STRIP.AUTO: ABNORMAL UNIT/L
NITRITE UR QL STRIP.AUTO: NEGATIVE
PH UR STRIP.AUTO: 6 [PH]
PROT UR QL STRIP.AUTO: NEGATIVE MG/DL
RBC #/AREA URNS AUTO: 5 /HPF
RBC UR QL AUTO: ABNORMAL UNIT/L
SP GR UR STRIP.AUTO: 1.01 (ref 1–1.03)
SQUAMOUS #/AREA URNS AUTO: 9 /HPF
UROBILINOGEN UR STRIP-ACNC: 0.2 MG/DL
WBC #/AREA URNS AUTO: 10 /HPF

## 2022-09-07 PROCEDURE — 25500020 PHARM REV CODE 255: Performed by: EMERGENCY MEDICINE

## 2022-09-07 PROCEDURE — 63600175 PHARM REV CODE 636 W HCPCS: Performed by: EMERGENCY MEDICINE

## 2022-09-07 PROCEDURE — 81001 URINALYSIS AUTO W/SCOPE: CPT | Performed by: STUDENT IN AN ORGANIZED HEALTH CARE EDUCATION/TRAINING PROGRAM

## 2022-09-07 PROCEDURE — 25000003 PHARM REV CODE 250: Performed by: EMERGENCY MEDICINE

## 2022-09-07 RX ORDER — KETOROLAC TROMETHAMINE 10 MG/1
10 TABLET, FILM COATED ORAL EVERY 6 HOURS
Qty: 20 TABLET | Refills: 0 | Status: SHIPPED | OUTPATIENT
Start: 2022-09-07 | End: 2022-09-12

## 2022-09-07 RX ORDER — MIDAZOLAM HYDROCHLORIDE 1 MG/ML
2 INJECTION INTRAMUSCULAR; INTRAVENOUS
Status: DISCONTINUED | OUTPATIENT
Start: 2022-09-07 | End: 2022-09-07

## 2022-09-07 RX ORDER — KETOROLAC TROMETHAMINE 30 MG/ML
30 INJECTION, SOLUTION INTRAMUSCULAR; INTRAVENOUS
Status: COMPLETED | OUTPATIENT
Start: 2022-09-07 | End: 2022-09-07

## 2022-09-07 RX ORDER — CEPHALEXIN 500 MG/1
500 CAPSULE ORAL 4 TIMES DAILY
Qty: 20 CAPSULE | Refills: 0 | Status: SHIPPED | OUTPATIENT
Start: 2022-09-07 | End: 2022-09-12

## 2022-09-07 RX ORDER — OXYCODONE AND ACETAMINOPHEN 10; 325 MG/1; MG/1
1 TABLET ORAL
Status: COMPLETED | OUTPATIENT
Start: 2022-09-07 | End: 2022-09-07

## 2022-09-07 RX ADMIN — SODIUM CHLORIDE: 9 INJECTION, SOLUTION INTRAVENOUS at 12:09

## 2022-09-07 RX ADMIN — KETOROLAC TROMETHAMINE 30 MG: 30 INJECTION, SOLUTION INTRAMUSCULAR; INTRAVENOUS at 01:09

## 2022-09-07 RX ADMIN — OXYCODONE AND ACETAMINOPHEN 1 TABLET: 10; 325 TABLET ORAL at 02:09

## 2022-09-07 RX ADMIN — IOPAMIDOL 100 ML: 755 INJECTION, SOLUTION INTRAVENOUS at 12:09

## 2022-09-07 NOTE — TELEPHONE ENCOUNTER
"Technically hormone patches not needed as ovaries remain. Can consider if really having menopausal symptoms.     As far as meds for mood, she currently has active rx for xanax from another provider. She has been offered zoloft or other SSRI, but states those meds have the opposite effect. She is asking for a "dopamine or oxytocin boosting" drug. The only med of this kind on the market is buproprion. She tells the nurse that she has tried that before and it does not work.     A quick literature search shows medications that are experimental or considered ilicit (controlled schedule I) and therefore not appropriate in this case.     Will give a trial of estradiol and progesterone patch to see if will help symptoms  "

## 2022-09-07 NOTE — ED PROVIDER NOTES
Encounter Date: 9/6/2022    SCRIBE #1 NOTE: I, Kd Yuen, am scribing for, and in the presence of,  Jaquelin Humphrey MD. I have scribed the following portions of the note - Other sections scribed: HPI, ROS, PE.     History     Chief Complaint   Patient presents with    Abdominal Pain     Had perico on 08/29/22 and having difficulty urinating since.  Having fever 102.1 since yesterday, severe lower abd pains.       41 y/o female with hx of endometriosis, lupus, and fibromyalgia presents to ED c/o difficulty urinating and lower abdominal pain since hysterectomy for uterine fibroids on 8/29.  Pt reports having to urinate frequently and being unable to sustain long streams of urine at any one attempted voiding. Pt notes that she has not had an appetite since her surgery and is running a fever since yesterday. Pt reports that her prescribed 600 mg ibuprofen is not helping. Pt reports that she has a planned follow up on 9/13.    Surgeon: Amira Ryder MD    The history is provided by the patient and medical records. No  was used.   Abdominal Pain  The other symptoms of the illness include fever and dysuria. The other symptoms of the illness do not include shortness of breath. Primary symptoms comment: difficulty urinating. The fever began yesterday. The fever has been unchanged since its onset. The fever has been present for 1 to 2 days. The maximum temperature recorded prior to her arrival was 102 to 102.9 F.   The dysuria began more than 1 week ago. The dysuria is associated with frequency.   Associated with: recent surgery. The patient states that she believes she is currently not pregnant. Risk factors for an acute abdominal problem include immunodeficiency and a history of abdominal surgery. Additional symptoms associated with the illness include frequency and back pain.   Review of patient's allergies indicates:   Allergen Reactions    Hydrocodone Palpitations    Levaquin [levofloxacin]  Itching and Photosensitivity    Quinolones Itching and Photosensitivity     Past Medical History:   Diagnosis Date    [place]     COVID 2021    Encounter for blood transfusion     Lupus     Rheumatoid arthritis, unspecified      Past Surgical History:   Procedure Laterality Date     SECTION      DERMOID CYST  EXCISION Left     BACK     FACIAL RECONSTRUCTION SURGERY Left     AGE 21    TOTAL ABDOMINAL HYSTERECTOMY N/A 2022    Procedure: HYSTERECTOMY, TOTAL, ABDOMINAL  AND BILATERAL SALPINGECTOMY;  Surgeon: Amira Ryder MD;  Location: [facility] OR;  Service: OB/GYN;  Laterality: N/A;    TUBAL LIGATION       Family History   Adopted: Yes   Family history unknown: Yes     Social History     Tobacco Use    Smoking status: Every Day     Types: Vaping w/o nicotine    Smokeless tobacco: Never    Tobacco comments:     THC OIL   Substance Use Topics    Alcohol use: Never    Drug use: Yes     Types: Marijuana     Comment: Medical Marijuana LAST USED 2022     Review of Systems   Constitutional:  Positive for fever.   Respiratory:  Negative for chest tightness and shortness of breath.    Genitourinary:  Positive for dysuria and frequency.   Musculoskeletal:  Positive for back pain.   All other systems reviewed and are negative.    Physical Exam     Initial Vitals [22]   BP Pulse Resp Temp SpO2   (!) 155/89 (!) 111 18 99.7 °F (37.6 °C) 99 %      MAP       --         Physical Exam    Nursing note and vitals reviewed.  Constitutional: She appears well-developed and well-nourished. No distress.   HENT:   Head: Normocephalic and atraumatic.   Mouth/Throat: Oropharynx is clear and moist.   Eyes: Conjunctivae are normal. Pupils are equal, round, and reactive to light.   Neck: Neck supple.   Normal range of motion.  Cardiovascular:  Regular rhythm and normal heart sounds.           No murmur heard.  Pulmonary/Chest: Breath sounds normal. No respiratory distress.   Abdominal: Abdomen is soft. She  exhibits no distension. There is abdominal tenderness (Mild, to palpation of surgical incision).   Abdominal CAMMIE incision well healed with steri strips, no surrounding erythema, no fluctuance or drainage    Musculoskeletal:         General: Normal range of motion.      Cervical back: Normal range of motion and neck supple.     Neurological: She is alert and oriented to person, place, and time. GCS score is 15. GCS eye subscore is 4. GCS verbal subscore is 5. GCS motor subscore is 6.   Skin: Skin is warm and dry. No rash noted.   Psychiatric: She has a normal mood and affect.       ED Course   Procedures  Labs Reviewed   URINALYSIS, REFLEX TO URINE CULTURE - Abnormal; Notable for the following components:       Result Value    Appearance, UA Cloudy (*)     Blood, UA 1+ (*)     Leukocyte Esterase, UA 1+ (*)     All other components within normal limits   COMPREHENSIVE METABOLIC PANEL - Abnormal; Notable for the following components:    Blood Urea Nitrogen 4.6 (*)     Albumin Level 3.4 (*)     All other components within normal limits   CBC WITH DIFFERENTIAL - Abnormal; Notable for the following components:    Hgb 11.3 (*)     Hct 36.6 (*)     MCH 26.5 (*)     MCHC 30.9 (*)     All other components within normal limits   URINALYSIS, MICROSCOPIC - Abnormal; Notable for the following components:    WBC, UA 10 (*)     Squamous Epithelial Cells, UA 9 (*)     All other components within normal limits   LIPASE - Normal   LACTIC ACID, PLASMA - Normal   BLOOD CULTURE [facility]   CBC W/ AUTO DIFFERENTIAL    Narrative:     The following orders were created for panel order CBC auto differential.  Procedure                               Abnormality         Status                     ---------                               -----------         ------                     CBC with Differential[[id number]]        Abnormal            Final result                 Please view results for these tests on the individual orders.          Imaging  Results              CT Abdomen Pelvis With Contrast (Preliminary result)  Result time 09/07/22 00:26:45      Preliminary result by Abraham Landis Jr., MD (09/07/22 00:26:45)                   Narrative:    START OF REPORT:  Technique: CT of the abdomen and pelvis was performed with axial images as well as sagittal and coronal reconstruction images with intravenous contrast.    Comparison: None available.    Clinical History: Severe abdominal pain after perico on 8/29.    Dosage Information: Automated Exposure Control was utilized.    Findings:  Lines and Tubes: None.  Thorax:  Lungs: The visualized lung bases appear unremarkable. There is minimal nonspecific dependent change at the lung bases. No focal infiltrate or consolidation is seen.  Pleura: No effusions or thickening. No pneumothorax is seen.  Heart: The heart size is within normal limits.  Abdomen:  Abdominal Wall: There is subcutaneous induration with overlying skin thickening and adjacent fat stranding, seen in the right lower pelvic wall, extending to the recti sheath bilaterally. This is seen on Series 2, Image 72 through 79. There is fat stranding seen overlying the bilateral lateral abdominal wall musculature. There is thickening and heterogeneity of the recti muscles inferiorly with a few air pockets and fat stranding in its immediate vicinity. These features are suggestive of post op edema. Cellulitis is not excluded. There is no collection or enhancing region seen to suggest hematoma or developing abscess.  Liver: The liver appears unremarkable.  Biliary System: No intrahepatic or extrahepatic biliary duct dilatation is seen.  Gallbladder: The gallbladder appears unremarkable.  Pancreas: The pancreas appears unremarkable. There is a small cyst seen in the distal pancreatic body, measuring 4.9 mm, series 2, image 27.  Spleen: The spleen appears unremarkable.  Adrenals: The adrenal glands appear unremarkable.  Kidneys: The kidneys appear  unremarkable with no stones cysts masses or hydronephrosis.  Aorta: The abdominal aorta appears unremarkable.  IVC: Unremarkable.  Bowel:  Esophagus: The visualized esophagus appears unremarkable.  Stomach: The stomach appears unremarkable.  Duodenum: Unremarkable appearing duodenum.  Small Bowel: The small bowel appears unremarkable.  Colon: There is moderate stool in the colon which could reflect an element of constipation.  Appendix: The appendix appears unremarkable and is seen on âImage 68, Series 2â.  Peritoneum: No intraperitoneal free air or ascites is seen. Mild free fluid is seen in the pelvis.    Pelvis:  Bladder: The bladder is nondistended but appears otherwise unremarkable.  Female:  Uterus: The uterus is not identified consistent with history of hysterectomy.  Ovaries: The ovaries are not identified. No adnexal masses are seen.  Inguinal Findings: Incidental note is made of a few prominent inguinal lymph nodes on the right. The largest node measures 1.4 mm, seen on series 2, image 87.    Bony structures:  Dorsal Spine: The visualized dorsal spine appears unremarkable.  Bony Pelvis: The visualized bony structures of the pelvis appear unremarkable.      Impression:  1. There is subcutaneous induration with overlying skin thickening and adjacent fat stranding, seen in the right lower pelvic wall, extending to the recti sheath bilaterally. This is seen on Series 2, Image 72 through 79. These features are suggestive of post op edema. Cellulitis is not excluded. There is no collection or enhancing region seen to suggest hematoma or developing abscess.  2. Mild free fluid is seen in the pelvis.  3. Details and other findings as discussed above.                          Preliminary result by Interface, Rad Results In (09/07/22 00:26:45)                   Narrative:    START OF REPORT:  Technique: CT of the abdomen and pelvis was performed with axial images as well as sagittal and coronal reconstruction  images with intravenous contrast.    Comparison: None available.    Clinical History: Severe abdominal pain after perico on 8/29.    Dosage Information: Automated Exposure Control was utilized.    Findings:  Lines and Tubes: None.  Thorax:  Lungs: The visualized lung bases appear unremarkable. There is minimal nonspecific dependent change at the lung bases. No focal infiltrate or consolidation is seen.  Pleura: No effusions or thickening. No pneumothorax is seen.  Heart: The heart size is within normal limits.  Abdomen:  Abdominal Wall: There is subcutaneous induration with overlying skin thickening and adjacent fat stranding, seen in the right lower pelvic wall, extending to the recti sheath bilaterally. This is seen on Series 2, Image 72 through 79. There is fat stranding seen overlying the bilateral lateral abdominal wall musculature. There is thickening and heterogeneity of the recti muscles inferiorly with a few air pockets and fat stranding in its immediate vicinity. These features are suggestive of post op edema. Cellulitis is not excluded. There is no collection or enhancing region seen to suggest hematoma or developing abscess.  Liver: The liver appears unremarkable.  Biliary System: No intrahepatic or extrahepatic biliary duct dilatation is seen.  Gallbladder: The gallbladder appears unremarkable.  Pancreas: The pancreas appears unremarkable. There is a small cyst seen in the distal pancreatic body, measuring 4.9 mm, series 2, image 27.  Spleen: The spleen appears unremarkable.  Adrenals: The adrenal glands appear unremarkable.  Kidneys: The kidneys appear unremarkable with no stones cysts masses or hydronephrosis.  Aorta: The abdominal aorta appears unremarkable.  IVC: Unremarkable.  Bowel:  Esophagus: The visualized esophagus appears unremarkable.  Stomach: The stomach appears unremarkable.  Duodenum: Unremarkable appearing duodenum.  Small Bowel: The small bowel appears unremarkable.  Colon: There is  moderate stool in the colon which could reflect an element of constipation.  Appendix: The appendix appears unremarkable and is seen on âImage 68, Series 2â.  Peritoneum: No intraperitoneal free air or ascites is seen. Mild free fluid is seen in the pelvis.    Pelvis:  Bladder: The bladder is nondistended but appears otherwise unremarkable.  Female:  Uterus: The uterus is not identified consistent with history of hysterectomy.  Ovaries: The ovaries are not identified. No adnexal masses are seen.  Inguinal Findings: Incidental note is made of a few prominent inguinal lymph nodes on the right. The largest node measures 1.4 mm, seen on series 2, image 87.    Bony structures:  Dorsal Spine: The visualized dorsal spine appears unremarkable.  Bony Pelvis: The visualized bony structures of the pelvis appear unremarkable.      Impression:  1. There is subcutaneous induration with overlying skin thickening and adjacent fat stranding, seen in the right lower pelvic wall, extending to the recti sheath bilaterally. This is seen on Series 2, Image 72 through 79. These features are suggestive of post op edema. Cellulitis is not excluded. There is no collection or enhancing region seen to suggest hematoma or developing abscess.  2. Mild free fluid is seen in the pelvis.  3. Details and other findings as discussed above.                                         Medications   sodium chloride 0.9% flush 10 mL (has no administration in time range)   0.9%  NaCl infusion ( Intravenous New Bag 9/7/22 0051)   iopamidoL (ISOVUE-370) injection 100 mL (100 mLs Intravenous Given 9/7/22 0027)   ketorolac injection 30 mg (30 mg Intravenous Given 9/7/22 0122)   oxyCODONE-acetaminophen  mg per tablet 1 tablet (1 tablet Oral Given 9/7/22 0244)     Medical Decision Making:   Initial Assessment:   Ms. House presented for evaluation of abdominal pain, difficulty urinating and reported fever in the post-op period following transabdominal  hysterectomy. Afebrile here, TTP in lower abdomen. Wound well approximated w/o evidence of superficial infection. Will attempt pain control, obtain labs/imaging.   Differential Diagnosis:   Post-operative pain, UTI, bladder spasm, intraabdominal fluid collection/seroma/abscess  Clinical Tests:   Lab Tests: Ordered and Reviewed  Radiological Study: Ordered and Reviewed  ED Management:  Labs w/ contaminated appearing UA, otherwise unrevealing. CT scan w/ thickening of the lower abdominal wall c/w edema per radiology, no skin erythema or appreciable induration on my examination. Recommended COVID testing given recent hospitalization and new fever w/o clear etiology at this time; however, she politely declines and would just prefer to go home at this time. Already on oxycodone at home, had significant relief here w/ Toradol, will Rx short course of toradol to help with ongoing pain control. Keflex provided given urinary symptoms as well as potential skin complications w/ reported high fever in the post-op setting. Instructed to continue other prescribed meds and contact her surgeon to arrange prompt follow up appointment. ED return precautions reviewed at the bedside and provided in the written discharge instructions. All questions answered to the best of my ability.             Scribe Attestation:   Scribe #1: I performed the above scribed service and the documentation accurately describes the services I performed. I attest to the accuracy of the note.    Attending Attestation:           Physician Attestation for Scribe:  Physician Attestation Statement for Scribe #1: I, Jaquelin Humphrey MD, reviewed documentation, as scribed by Kd Yuen in my presence, and it is both accurate and complete.           ED Course as of 09/12/22 1138   Wed Sep 07, 2022   0301 Patient states she doesn't want COVID/Flu test because she knows this is not the cause of her fever/pain. Advised given recent hospitalization, etc, is at  increased risk and reasonable to rule out/in. She still would not like to be tested at this time. Will DC home w/ NSAIDs to take in addition to her previously prescribed oxycodone. Keflex for possible UTI. ED return precautions reviewed at the bedside and provided in the written discharge instructions. All questions answered to the best of my ability.  [KS]      ED Course User Index  [KS] Jaquelin Humphrey MD             Clinical Impression:   Final diagnoses:  [R10.30] Lower abdominal pain (Primary)  [R39.11] Urinary hesitancy  [Z90.710] Status post abdominal hysterectomy  [R50.9] Fever, unspecified fever cause      ED Disposition Condition    Discharge Stable          ED Prescriptions       Medication Sig Dispense Start Date End Date Auth. Provider    ketorolac (TORADOL) 10 mg tablet Take 1 tablet (10 mg total) by mouth every 6 (six) hours. for 5 days 20 tablet 9/7/2022 9/12/2022 Jaquelin Humphrey MD    cephALEXin (KEFLEX) 500 MG capsule Take 1 capsule (500 mg total) by mouth 4 (four) times daily. for 5 days 20 capsule 9/7/2022 9/12/2022 Jaquelin Humphrey MD          Follow-up Information       Follow up With Specialties Details Why Contact Info    Your OBGYN   keep scheduled appointment. Call for any concerns.     [facility] - Emergency Dept Emergency Medicine  As needed, If symptoms worsen [address] [facility] [id number]  [phone]             Jaquelin Humphrey MD  09/12/22 1726

## 2022-09-08 ENCOUNTER — OFFICE VISIT (OUTPATIENT)
Dept: OBSTETRICS AND GYNECOLOGY | Facility: CLINIC | Age: 42
End: 2022-09-08
Payer: MEDICAID

## 2022-09-08 VITALS
SYSTOLIC BLOOD PRESSURE: 152 MMHG | HEART RATE: 103 BPM | WEIGHT: 234 LBS | BODY MASS INDEX: 37.61 KG/M2 | DIASTOLIC BLOOD PRESSURE: 108 MMHG | HEIGHT: 66 IN

## 2022-09-08 DIAGNOSIS — K59.00 CONSTIPATION, UNSPECIFIED CONSTIPATION TYPE: ICD-10-CM

## 2022-09-08 DIAGNOSIS — R30.0 DYSURIA: ICD-10-CM

## 2022-09-08 DIAGNOSIS — T81.31XA OPEN ABDOMINAL INCISION WITH DRAINAGE, INITIAL ENCOUNTER: ICD-10-CM

## 2022-09-08 DIAGNOSIS — R10.2 PELVIC PAIN: Primary | ICD-10-CM

## 2022-09-08 DIAGNOSIS — F41.9 ANXIETY: ICD-10-CM

## 2022-09-08 DIAGNOSIS — G89.18 ACUTE POSTOPERATIVE PAIN: ICD-10-CM

## 2022-09-08 PROCEDURE — 3008F PR BODY MASS INDEX (BMI) DOCUMENTED: ICD-10-PCS | Mod: CPTII,,, | Performed by: OBSTETRICS & GYNECOLOGY

## 2022-09-08 PROCEDURE — 3077F PR MOST RECENT SYSTOLIC BLOOD PRESSURE >= 140 MM HG: ICD-10-PCS | Mod: CPTII,,, | Performed by: OBSTETRICS & GYNECOLOGY

## 2022-09-08 PROCEDURE — 3080F PR MOST RECENT DIASTOLIC BLOOD PRESSURE >= 90 MM HG: ICD-10-PCS | Mod: CPTII,,, | Performed by: OBSTETRICS & GYNECOLOGY

## 2022-09-08 PROCEDURE — 1159F PR MEDICATION LIST DOCUMENTED IN MEDICAL RECORD: ICD-10-PCS | Mod: CPTII,,, | Performed by: OBSTETRICS & GYNECOLOGY

## 2022-09-08 PROCEDURE — 3008F BODY MASS INDEX DOCD: CPT | Mod: CPTII,,, | Performed by: OBSTETRICS & GYNECOLOGY

## 2022-09-08 PROCEDURE — 3080F DIAST BP >= 90 MM HG: CPT | Mod: CPTII,,, | Performed by: OBSTETRICS & GYNECOLOGY

## 2022-09-08 PROCEDURE — 99213 OFFICE O/P EST LOW 20 MIN: CPT | Mod: PBBFAC | Performed by: OBSTETRICS & GYNECOLOGY

## 2022-09-08 PROCEDURE — 99999 PR PBB SHADOW E&M-EST. PATIENT-LVL III: ICD-10-PCS | Mod: PBBFAC,,, | Performed by: OBSTETRICS & GYNECOLOGY

## 2022-09-08 PROCEDURE — 1159F MED LIST DOCD IN RCRD: CPT | Mod: CPTII,,, | Performed by: OBSTETRICS & GYNECOLOGY

## 2022-09-08 PROCEDURE — 3077F SYST BP >= 140 MM HG: CPT | Mod: CPTII,,, | Performed by: OBSTETRICS & GYNECOLOGY

## 2022-09-08 PROCEDURE — 99024 POSTOP FOLLOW-UP VISIT: CPT | Mod: ,,, | Performed by: OBSTETRICS & GYNECOLOGY

## 2022-09-08 PROCEDURE — 99024 PR POST-OP FOLLOW-UP VISIT: ICD-10-PCS | Mod: ,,, | Performed by: OBSTETRICS & GYNECOLOGY

## 2022-09-08 PROCEDURE — 99999 PR PBB SHADOW E&M-EST. PATIENT-LVL III: CPT | Mod: PBBFAC,,, | Performed by: OBSTETRICS & GYNECOLOGY

## 2022-09-08 RX ORDER — LANOLIN ALCOHOL/MO/W.PET/CERES
400 CREAM (GRAM) TOPICAL DAILY
Qty: 200 TABLET | Refills: 1 | Status: SHIPPED | OUTPATIENT
Start: 2022-09-08 | End: 2022-10-12 | Stop reason: SDUPTHER

## 2022-09-08 NOTE — PROGRESS NOTES
Subjective:    Patient ID: Nanda House is a 42 y.o. y.o. female    Chief Complaint:   Chief Complaint   Patient presents with    Post-op Evaluation     States she went to er 2 days ago incision has opened and is draining yellow,pink red d/c. C/o fever, decreased appeitite, dysuria, and pain.        History of Present Illness:  Nanda presents today for follow up of ER visit since surgery. She states she went to ER 2 days ago for fever, dysuria and pain and decreased appetite. Also has constipation. Late last night/this AM her incision opened up and is draining yellow pink.      Review of Systems   Constitutional:  Negative for chills, fatigue and fever.   Respiratory:  Negative for shortness of breath.    Cardiovascular:  Negative for chest pain.   Gastrointestinal:  Positive for abdominal pain and constipation. Negative for diarrhea and nausea.   Genitourinary:  Positive for pelvic pain. Negative for bladder incontinence, dysuria, hot flashes and vaginal bleeding.   Neurological:  Negative for headaches.   Psychiatric/Behavioral:  Negative for depression.        Objective:    Vital Signs:  Vitals:    09/08/22 1323   BP: (!) 152/108   Pulse: 103     Wt Readings from Last 1 Encounters:   09/08/22 106.1 kg (234 lb)     Body mass index is 37.77 kg/m².    Physical Exam:  General:  alert, no distress   Skin:  Skin color, texture, turgor normal. No rashes or lesions   Abdomen:   Soft, nontender. Approx 3 mm open area on right aspect of incision with serosanguinous fluid. No evidence of infection. Area dressed with 4x4's and abd pad    Extremities: No cyanosis, clubbing, edema       Serosanguinaous fluid noted from incision and reassurance given that currently no evidence of infection at abdomen. Explained that her decreased appetite correlates with the constipation. discussed need to have better bowel motility and recommended daily magnesium oxide supplements  Instructed to take keflex for the bladder  infection        Assessment:      1. Pelvic pain    2. Dysuria    3. Constipation, unspecified constipation type    4. Anxiety    5. Acute postoperative pain    6. Open abdominal incision with drainage, initial encounter          Plan:      Pelvic pain    Dysuria    Constipation, unspecified constipation type    Anxiety    Acute postoperative pain    Open abdominal incision with drainage, initial encounter  Comments:  opening approximately 3 mm    Other orders  -     magnesium oxide (MAGOX) 400 mg (241.3 mg magnesium) tablet; Take 1 tablet (400 mg total) by mouth once daily.  Dispense: 200 tablet; Refill: 1  -     nitrofurantoin, macrocrystal-monohydrate, (MACROBID) 100 MG capsule; Take 1 capsule (100 mg total) by mouth 2 (two) times daily. for 7 days  Dispense: 14 capsule; Refill: 0         Amira Ryder MD, FACOG   09/08/2022 1:53 PM

## 2022-09-09 ENCOUNTER — TELEPHONE (OUTPATIENT)
Dept: OBSTETRICS AND GYNECOLOGY | Facility: CLINIC | Age: 42
End: 2022-09-09
Payer: MEDICAID

## 2022-09-09 RX ORDER — NITROFURANTOIN 25; 75 MG/1; MG/1
100 CAPSULE ORAL 2 TIMES DAILY
Qty: 14 CAPSULE | Refills: 0 | Status: SHIPPED | OUTPATIENT
Start: 2022-09-09 | End: 2022-09-16

## 2022-09-12 LAB — BACTERIA BLD CULT: NORMAL

## 2022-09-13 ENCOUNTER — TELEPHONE (OUTPATIENT)
Dept: OBSTETRICS AND GYNECOLOGY | Facility: CLINIC | Age: 42
End: 2022-09-13
Payer: MEDICAID

## 2022-09-13 DIAGNOSIS — R30.0 DYSURIA: Primary | ICD-10-CM

## 2022-09-14 RX ORDER — AMOXICILLIN 875 MG/1
875 TABLET, FILM COATED ORAL EVERY 12 HOURS
Qty: 14 TABLET | Refills: 0 | Status: SHIPPED | OUTPATIENT
Start: 2022-09-14 | End: 2022-09-21

## 2022-09-26 ENCOUNTER — TELEPHONE (OUTPATIENT)
Dept: OBSTETRICS AND GYNECOLOGY | Facility: CLINIC | Age: 42
End: 2022-09-26
Payer: MEDICAID

## 2022-09-26 DIAGNOSIS — M79.7 FIBROMYALGIA SYNDROME: ICD-10-CM

## 2022-09-26 DIAGNOSIS — G43.111 INTRACTABLE MIGRAINE WITH AURA WITH STATUS MIGRAINOSUS: ICD-10-CM

## 2022-09-26 DIAGNOSIS — M35.9 UNDIFFERENTIATED CONNECTIVE TISSUE DISEASE: Primary | ICD-10-CM

## 2022-09-26 RX ORDER — HYDROCODONE BITARTRATE AND ACETAMINOPHEN 5; 325 MG/1; MG/1
1 TABLET ORAL EVERY 12 HOURS PRN
Qty: 60 TABLET | Refills: 0 | Status: SHIPPED | OUTPATIENT
Start: 2022-09-26 | End: 2022-10-27 | Stop reason: SDUPTHER

## 2022-09-26 NOTE — TELEPHONE ENCOUNTER
----- Message from Whitley Mcconnell sent at 9/26/2022  9:43 AM CDT -----  Regarding: medication refill  Patient called requesting refill for [place]. [facility]. Also requesting nurse give her a call @ [phone]

## 2022-09-29 ENCOUNTER — OFFICE VISIT (OUTPATIENT)
Dept: OBSTETRICS AND GYNECOLOGY | Facility: CLINIC | Age: 42
End: 2022-09-29
Payer: MEDICAID

## 2022-09-29 VITALS
DIASTOLIC BLOOD PRESSURE: 93 MMHG | HEART RATE: 87 BPM | HEIGHT: 66 IN | WEIGHT: 231 LBS | BODY MASS INDEX: 37.12 KG/M2 | SYSTOLIC BLOOD PRESSURE: 151 MMHG

## 2022-09-29 DIAGNOSIS — F41.9 ANXIETY: ICD-10-CM

## 2022-09-29 DIAGNOSIS — Z90.710 S/P HYSTERECTOMY: ICD-10-CM

## 2022-09-29 DIAGNOSIS — N95.1 MENOPAUSAL SYMPTOM: Primary | ICD-10-CM

## 2022-09-29 DIAGNOSIS — M32.9 LUPUS: ICD-10-CM

## 2022-09-29 LAB
ESTRADIOL SERPL-MCNC: 27 PG/ML
FSH SERPL-ACNC: 10.31 MIU/ML
T4 SERPL-MCNC: 11.7 UG/DL (ref 4.5–11.5)
TESTOST SERPL-MCNC: 18 NG/DL (ref 5–73)
TSH SERPL DL<=0.005 MIU/L-ACNC: 1.65 UIU/ML (ref 0.4–4)

## 2022-09-29 PROCEDURE — 99024 PR POST-OP FOLLOW-UP VISIT: ICD-10-PCS | Mod: ,,, | Performed by: OBSTETRICS & GYNECOLOGY

## 2022-09-29 PROCEDURE — 84443 ASSAY THYROID STIM HORMONE: CPT | Performed by: OBSTETRICS & GYNECOLOGY

## 2022-09-29 PROCEDURE — 99999 PR PBB SHADOW E&M-EST. PATIENT-LVL III: CPT | Mod: PBBFAC,,, | Performed by: OBSTETRICS & GYNECOLOGY

## 2022-09-29 PROCEDURE — 86376 MICROSOMAL ANTIBODY EACH: CPT | Performed by: OBSTETRICS & GYNECOLOGY

## 2022-09-29 PROCEDURE — 1160F RVW MEDS BY RX/DR IN RCRD: CPT | Mod: CPTII,,, | Performed by: OBSTETRICS & GYNECOLOGY

## 2022-09-29 PROCEDURE — 3077F SYST BP >= 140 MM HG: CPT | Mod: CPTII,,, | Performed by: OBSTETRICS & GYNECOLOGY

## 2022-09-29 PROCEDURE — 1160F PR REVIEW ALL MEDS BY PRESCRIBER/CLIN PHARMACIST DOCUMENTED: ICD-10-PCS | Mod: CPTII,,, | Performed by: OBSTETRICS & GYNECOLOGY

## 2022-09-29 PROCEDURE — 3077F PR MOST RECENT SYSTOLIC BLOOD PRESSURE >= 140 MM HG: ICD-10-PCS | Mod: CPTII,,, | Performed by: OBSTETRICS & GYNECOLOGY

## 2022-09-29 PROCEDURE — 99213 OFFICE O/P EST LOW 20 MIN: CPT | Mod: PBBFAC | Performed by: OBSTETRICS & GYNECOLOGY

## 2022-09-29 PROCEDURE — 82670 ASSAY OF TOTAL ESTRADIOL: CPT | Performed by: OBSTETRICS & GYNECOLOGY

## 2022-09-29 PROCEDURE — 3080F DIAST BP >= 90 MM HG: CPT | Mod: CPTII,,, | Performed by: OBSTETRICS & GYNECOLOGY

## 2022-09-29 PROCEDURE — 83001 ASSAY OF GONADOTROPIN (FSH): CPT | Performed by: OBSTETRICS & GYNECOLOGY

## 2022-09-29 PROCEDURE — 99999 PR PBB SHADOW E&M-EST. PATIENT-LVL III: ICD-10-PCS | Mod: PBBFAC,,, | Performed by: OBSTETRICS & GYNECOLOGY

## 2022-09-29 PROCEDURE — 3008F BODY MASS INDEX DOCD: CPT | Mod: CPTII,,, | Performed by: OBSTETRICS & GYNECOLOGY

## 2022-09-29 PROCEDURE — 3008F PR BODY MASS INDEX (BMI) DOCUMENTED: ICD-10-PCS | Mod: CPTII,,, | Performed by: OBSTETRICS & GYNECOLOGY

## 2022-09-29 PROCEDURE — 99024 POSTOP FOLLOW-UP VISIT: CPT | Mod: ,,, | Performed by: OBSTETRICS & GYNECOLOGY

## 2022-09-29 PROCEDURE — 1159F PR MEDICATION LIST DOCUMENTED IN MEDICAL RECORD: ICD-10-PCS | Mod: CPTII,,, | Performed by: OBSTETRICS & GYNECOLOGY

## 2022-09-29 PROCEDURE — 3080F PR MOST RECENT DIASTOLIC BLOOD PRESSURE >= 90 MM HG: ICD-10-PCS | Mod: CPTII,,, | Performed by: OBSTETRICS & GYNECOLOGY

## 2022-09-29 PROCEDURE — 84403 ASSAY OF TOTAL TESTOSTERONE: CPT | Performed by: OBSTETRICS & GYNECOLOGY

## 2022-09-29 PROCEDURE — 84436 ASSAY OF TOTAL THYROXINE: CPT | Performed by: OBSTETRICS & GYNECOLOGY

## 2022-09-29 PROCEDURE — 1159F MED LIST DOCD IN RCRD: CPT | Mod: CPTII,,, | Performed by: OBSTETRICS & GYNECOLOGY

## 2022-09-29 NOTE — PROGRESS NOTES
"Subjective:    Patient ID: Nanda House is a 42 y.o. y.o. female    Chief Complaint:   Chief Complaint   Patient presents with    Post-op Evaluation     4 wk f/u states no c/o with  healing, asking for hormone labs states she thinks she is "estrogen dominant" also states she has not taken medication prescribed at last visit out of fear of adverse side effects.       History of Present Illness:  Nanda presents today for follow up of hysterectomy. Feeling better but wants hormone levels checked. Did not start the HRT rx'd from last visit      Review of Systems   Constitutional:  Negative for chills, fatigue and fever.   Respiratory:  Negative for shortness of breath.    Cardiovascular:  Negative for chest pain.   Gastrointestinal:  Negative for abdominal pain, constipation, diarrhea and nausea.   Genitourinary:  Negative for bladder incontinence, dysuria, hot flashes, pelvic pain and vaginal bleeding.   Neurological:  Negative for headaches.   Psychiatric/Behavioral:  Negative for depression.        Objective:    Vital Signs:  Vitals:    09/29/22 1402   BP: (!) 151/93   Pulse: 87     Wt Readings from Last 1 Encounters:   09/29/22 104.8 kg (231 lb)     Body mass index is 37.28 kg/m².    Physical Exam:  General:  alert, no distress   Skin:  Skin color, texture, turgor normal. No rashes or lesions   Abdomen:   Soft, nontender   Extremities: No cyanosis, clubbing, edema     Will order hormone levels today to assess there status today per patient's request.  Reassurance and encouragement given regarding recovery process.  All questions answered          Assessment:      1. Menopausal symptom    2. Lupus    3. Anxiety    4. S/P hysterectomy          Plan:      Menopausal symptom  -     Estradiol; Future; Expected date: 09/29/2022  -     Testosterone; Future; Expected date: 09/29/2022  -     Follicle Stimulating Hormone; Future; Expected date: 09/29/2022  -     TSH; Future; Expected date: 09/29/2022  -     T4; Future; " Expected date: 09/29/2022  -     Thyroid Peroxidase Antibody; Future; Expected date: 09/29/2022    Lupus    Anxiety    S/P hysterectomy    Rtc 2 weeks for post op       Amira Ryder MD, FACOG   09/29/2022 2:10 PM

## 2022-09-30 LAB — THYROPEROXIDASE IGG SERPL-ACNC: <6 IU/ML

## 2022-10-13 ENCOUNTER — OFFICE VISIT (OUTPATIENT)
Dept: OBSTETRICS AND GYNECOLOGY | Facility: CLINIC | Age: 42
End: 2022-10-13
Payer: MEDICAID

## 2022-10-13 VITALS
SYSTOLIC BLOOD PRESSURE: 148 MMHG | BODY MASS INDEX: 37.45 KG/M2 | DIASTOLIC BLOOD PRESSURE: 92 MMHG | HEIGHT: 66 IN | WEIGHT: 233 LBS

## 2022-10-13 DIAGNOSIS — F41.9 ANXIETY: Primary | ICD-10-CM

## 2022-10-13 DIAGNOSIS — M32.9 LUPUS: ICD-10-CM

## 2022-10-13 DIAGNOSIS — Z09 POSTOP CHECK: ICD-10-CM

## 2022-10-13 DIAGNOSIS — Z90.710 S/P HYSTERECTOMY: ICD-10-CM

## 2022-10-13 PROCEDURE — 99024 PR POST-OP FOLLOW-UP VISIT: ICD-10-PCS | Mod: ,,, | Performed by: OBSTETRICS & GYNECOLOGY

## 2022-10-13 PROCEDURE — 99024 POSTOP FOLLOW-UP VISIT: CPT | Mod: ,,, | Performed by: OBSTETRICS & GYNECOLOGY

## 2022-10-13 PROCEDURE — 1159F PR MEDICATION LIST DOCUMENTED IN MEDICAL RECORD: ICD-10-PCS | Mod: CPTII,,, | Performed by: OBSTETRICS & GYNECOLOGY

## 2022-10-13 PROCEDURE — 3080F PR MOST RECENT DIASTOLIC BLOOD PRESSURE >= 90 MM HG: ICD-10-PCS | Mod: CPTII,,, | Performed by: OBSTETRICS & GYNECOLOGY

## 2022-10-13 PROCEDURE — 3080F DIAST BP >= 90 MM HG: CPT | Mod: CPTII,,, | Performed by: OBSTETRICS & GYNECOLOGY

## 2022-10-13 PROCEDURE — 1160F RVW MEDS BY RX/DR IN RCRD: CPT | Mod: CPTII,,, | Performed by: OBSTETRICS & GYNECOLOGY

## 2022-10-13 PROCEDURE — 99999 PR PBB SHADOW E&M-EST. PATIENT-LVL III: CPT | Mod: PBBFAC,,, | Performed by: OBSTETRICS & GYNECOLOGY

## 2022-10-13 PROCEDURE — 3077F PR MOST RECENT SYSTOLIC BLOOD PRESSURE >= 140 MM HG: ICD-10-PCS | Mod: CPTII,,, | Performed by: OBSTETRICS & GYNECOLOGY

## 2022-10-13 PROCEDURE — 1160F PR REVIEW ALL MEDS BY PRESCRIBER/CLIN PHARMACIST DOCUMENTED: ICD-10-PCS | Mod: CPTII,,, | Performed by: OBSTETRICS & GYNECOLOGY

## 2022-10-13 PROCEDURE — 3077F SYST BP >= 140 MM HG: CPT | Mod: CPTII,,, | Performed by: OBSTETRICS & GYNECOLOGY

## 2022-10-13 PROCEDURE — 99213 OFFICE O/P EST LOW 20 MIN: CPT | Mod: PBBFAC | Performed by: OBSTETRICS & GYNECOLOGY

## 2022-10-13 PROCEDURE — 99999 PR PBB SHADOW E&M-EST. PATIENT-LVL III: ICD-10-PCS | Mod: PBBFAC,,, | Performed by: OBSTETRICS & GYNECOLOGY

## 2022-10-13 PROCEDURE — 1159F MED LIST DOCD IN RCRD: CPT | Mod: CPTII,,, | Performed by: OBSTETRICS & GYNECOLOGY

## 2022-10-13 NOTE — PROGRESS NOTES
Subjective:    Patient ID: Nanda House is a 42 y.o. y.o. female    Chief Complaint:   Chief Complaint   Patient presents with    Post-op Evaluation       History of Present Illness:  Nanda presents today for follow up of hysterectomy.  Patient states feels much better now.  She reports that she is moving around more and her bowels are moving much better.  Also states she physically as well as emotionally feels better.      Review of Systems   Constitutional:  Negative for chills, fatigue and fever.   Respiratory:  Negative for shortness of breath.    Cardiovascular:  Negative for chest pain.   Gastrointestinal:  Negative for abdominal pain, constipation, diarrhea and nausea.   Genitourinary:  Negative for bladder incontinence, dysuria, hot flashes, pelvic pain and vaginal bleeding.   Neurological:  Negative for headaches.   Psychiatric/Behavioral:  Negative for depression.        Objective:    Vital Signs:  Vitals:    10/13/22 1519   BP: (!) 148/92     Wt Readings from Last 1 Encounters:   10/13/22 105.7 kg (233 lb)     Body mass index is 37.61 kg/m².    Physical Exam:  General:  alert, no distress   Skin:  Skin color, texture, turgor normal. No rashes or lesions   Abdomen:   Soft, nontender.  Incision is clean, dry, intact   Pelvis: External genitalia: normal general appearance  Urinary system: urethral meatus normal, bladder nontender  Vaginal: normal mucosa without prolapse or lesions  Cervix: removed surgically, cuff intact  Uterus: removed surgically  Adnexa: normal bimanual exam       Discussed may resume normal activities but with intercourse needs to proceed slowly with caution and if anything hurts to stop.  All questions answered        Assessment:      1. Anxiety    2. Lupus    3. S/P hysterectomy    4. Postop check          Plan:      Anxiety    Lupus    S/P hysterectomy    Postop check    Return to clinic for annual exam     Amira Ryder MD, FACOG   10/13/2022 3:43 PM

## 2022-10-27 DIAGNOSIS — M35.9 UNDIFFERENTIATED CONNECTIVE TISSUE DISEASE: ICD-10-CM

## 2022-10-27 DIAGNOSIS — M79.7 FIBROMYALGIA SYNDROME: ICD-10-CM

## 2022-10-27 DIAGNOSIS — G43.111 INTRACTABLE MIGRAINE WITH AURA WITH STATUS MIGRAINOSUS: ICD-10-CM

## 2022-10-27 RX ORDER — HYDROCODONE BITARTRATE AND ACETAMINOPHEN 5; 325 MG/1; MG/1
1 TABLET ORAL EVERY 12 HOURS PRN
Qty: 60 TABLET | Refills: 0 | Status: SHIPPED | OUTPATIENT
Start: 2022-10-27 | End: 2022-11-21 | Stop reason: SDUPTHER

## 2022-11-21 DIAGNOSIS — M79.7 FIBROMYALGIA SYNDROME: ICD-10-CM

## 2022-11-21 DIAGNOSIS — M35.9 UNDIFFERENTIATED CONNECTIVE TISSUE DISEASE: ICD-10-CM

## 2022-11-21 DIAGNOSIS — G43.111 INTRACTABLE MIGRAINE WITH AURA WITH STATUS MIGRAINOSUS: ICD-10-CM

## 2022-11-22 RX ORDER — HYDROCODONE BITARTRATE AND ACETAMINOPHEN 5; 325 MG/1; MG/1
1 TABLET ORAL 2 TIMES DAILY PRN
Qty: 60 TABLET | Refills: 0 | Status: SHIPPED | OUTPATIENT
Start: 2022-11-22 | End: 2023-01-03

## 2022-12-20 ENCOUNTER — TELEPHONE (OUTPATIENT)
Dept: RHEUMATOLOGY | Facility: CLINIC | Age: 42
End: 2022-12-20
Payer: MEDICAID

## 2022-12-20 DIAGNOSIS — M35.9 UNDIFFERENTIATED CONNECTIVE TISSUE DISEASE: ICD-10-CM

## 2022-12-20 DIAGNOSIS — M79.7 FIBROMYALGIA SYNDROME: ICD-10-CM

## 2022-12-20 DIAGNOSIS — G43.111 INTRACTABLE MIGRAINE WITH AURA WITH STATUS MIGRAINOSUS: ICD-10-CM

## 2022-12-20 RX ORDER — HYDROCODONE BITARTRATE AND ACETAMINOPHEN 7.5; 325 MG/1; MG/1
1 TABLET ORAL 2 TIMES DAILY PRN
Qty: 60 TABLET | Refills: 0 | Status: SHIPPED | OUTPATIENT
Start: 2022-12-20 | End: 2023-01-03

## 2022-12-20 NOTE — TELEPHONE ENCOUNTER
See message below.  She is requesting Norco be increased to 7.5 mg bid.  DLS:  07/01/2022 NOV:  01/03/23

## 2022-12-20 NOTE — TELEPHONE ENCOUNTER
----- Message from Tess Bernal sent at 12/20/2022 10:29 AM CST -----  Regarding: Refill  Type:  RX Refill Request    Who Called: Theresa Perez or New Rx:refill  RX Name and Strength:HYDROcodone-acetaminophen (NORCO) 5-325 mg per tablet  How is the patient currently taking it? (ex. 1XDay):  Is this a 30 day or 90 day RX:  Preferred Pharmacy with phone number:[facility] in [place]  Local or Mail Order:  Ordering Provider:  Would the patient rather a call back or a response via [facility]?     Best Call Back Number:[phone]      Additional Information: Patient is requesting we up her rx to 7.5 2x a day.

## 2023-01-03 ENCOUNTER — OFFICE VISIT (OUTPATIENT)
Dept: RHEUMATOLOGY | Facility: CLINIC | Age: 43
End: 2023-01-03
Payer: MEDICAID

## 2023-01-03 VITALS
BODY MASS INDEX: 38.57 KG/M2 | HEART RATE: 82 BPM | SYSTOLIC BLOOD PRESSURE: 129 MMHG | DIASTOLIC BLOOD PRESSURE: 82 MMHG | WEIGHT: 240 LBS | TEMPERATURE: 99 F | HEIGHT: 66 IN

## 2023-01-03 DIAGNOSIS — M79.7 FIBROMYALGIA SYNDROME: ICD-10-CM

## 2023-01-03 DIAGNOSIS — M32.19 OTHER SYSTEMIC LUPUS ERYTHEMATOSUS WITH OTHER ORGAN INVOLVEMENT: Primary | ICD-10-CM

## 2023-01-03 DIAGNOSIS — F51.01 PRIMARY INSOMNIA: ICD-10-CM

## 2023-01-03 DIAGNOSIS — G43.909 MIGRAINE WITHOUT STATUS MIGRAINOSUS, NOT INTRACTABLE, UNSPECIFIED MIGRAINE TYPE: ICD-10-CM

## 2023-01-03 PROCEDURE — 3074F PR MOST RECENT SYSTOLIC BLOOD PRESSURE < 130 MM HG: ICD-10-PCS | Mod: CPTII,,, | Performed by: INTERNAL MEDICINE

## 2023-01-03 PROCEDURE — 99214 OFFICE O/P EST MOD 30 MIN: CPT | Mod: S$PBB,,, | Performed by: INTERNAL MEDICINE

## 2023-01-03 PROCEDURE — 3074F SYST BP LT 130 MM HG: CPT | Mod: CPTII,,, | Performed by: INTERNAL MEDICINE

## 2023-01-03 PROCEDURE — 1159F MED LIST DOCD IN RCRD: CPT | Mod: CPTII,,, | Performed by: INTERNAL MEDICINE

## 2023-01-03 PROCEDURE — 3079F DIAST BP 80-89 MM HG: CPT | Mod: CPTII,,, | Performed by: INTERNAL MEDICINE

## 2023-01-03 PROCEDURE — 1159F PR MEDICATION LIST DOCUMENTED IN MEDICAL RECORD: ICD-10-PCS | Mod: CPTII,,, | Performed by: INTERNAL MEDICINE

## 2023-01-03 PROCEDURE — 3008F PR BODY MASS INDEX (BMI) DOCUMENTED: ICD-10-PCS | Mod: CPTII,,, | Performed by: INTERNAL MEDICINE

## 2023-01-03 PROCEDURE — 99999 PR PBB SHADOW E&M-EST. PATIENT-LVL III: ICD-10-PCS | Mod: PBBFAC,,, | Performed by: INTERNAL MEDICINE

## 2023-01-03 PROCEDURE — 99999 PR PBB SHADOW E&M-EST. PATIENT-LVL III: CPT | Mod: PBBFAC,,, | Performed by: INTERNAL MEDICINE

## 2023-01-03 PROCEDURE — 3008F BODY MASS INDEX DOCD: CPT | Mod: CPTII,,, | Performed by: INTERNAL MEDICINE

## 2023-01-03 PROCEDURE — 3079F PR MOST RECENT DIASTOLIC BLOOD PRESSURE 80-89 MM HG: ICD-10-PCS | Mod: CPTII,,, | Performed by: INTERNAL MEDICINE

## 2023-01-03 PROCEDURE — 99213 OFFICE O/P EST LOW 20 MIN: CPT | Mod: PBBFAC | Performed by: INTERNAL MEDICINE

## 2023-01-03 PROCEDURE — 99214 PR OFFICE/OUTPT VISIT, EST, LEVL IV, 30-39 MIN: ICD-10-PCS | Mod: S$PBB,,, | Performed by: INTERNAL MEDICINE

## 2023-01-03 RX ORDER — ZOLPIDEM TARTRATE 10 MG/1
10 TABLET ORAL NIGHTLY
Qty: 30 TABLET | Refills: 5 | Status: SHIPPED | OUTPATIENT
Start: 2023-01-03 | End: 2023-07-02

## 2023-01-03 RX ORDER — HYDROCODONE BITARTRATE AND ACETAMINOPHEN 10; 325 MG/1; MG/1
1 TABLET ORAL 3 TIMES DAILY PRN
Qty: 90 TABLET | Refills: 0 | Status: SHIPPED | OUTPATIENT
Start: 2023-01-03 | End: 2023-01-03

## 2023-01-03 RX ORDER — FOLIC ACID 1 MG/1
1 TABLET ORAL DAILY
Qty: 30 TABLET | Refills: 5 | Status: SHIPPED | OUTPATIENT
Start: 2023-01-03 | End: 2023-06-09

## 2023-01-03 RX ORDER — METHOTREXATE 2.5 MG/1
10 TABLET ORAL
Qty: 20 TABLET | Refills: 5 | Status: SHIPPED | OUTPATIENT
Start: 2023-01-03 | End: 2023-06-09

## 2023-01-03 RX ORDER — HYDROCODONE BITARTRATE AND ACETAMINOPHEN 10; 325 MG/1; MG/1
1 TABLET ORAL 3 TIMES DAILY PRN
Qty: 90 TABLET | Refills: 0 | Status: SHIPPED | OUTPATIENT
Start: 2023-01-03 | End: 2023-01-04 | Stop reason: SDUPTHER

## 2023-01-03 NOTE — PROGRESS NOTES
"Subjective:       Patient ID: Nanda House is a 42 y.o. female.    Chief Complaint: Follow-up (PATIENT STATES SHE IS HAVING MORE JOINT PAIN AND IS HAVING A RASH ON HER HEAD. WANTS TO BE REFERRED FOR WEIGHT LOSS SURGERY. UNABLE TO WALK LONG DISTANCES.)    The patient is complaining of joint pain involving the MCP PIP wrist elbow shoulders hips knees and ankles bilaterally.  The pain is 9/10 in intensity dull in quality and continuous.  That is associated with a morning stiffness lasting for more than 60 minutes.  Is also having difficulty maintaining a good night of sleep.  This has been associated with myalgias.  Muscle aches are 9/10 in intensity dull in quality and continuous.  They are associated with fatigue.  No fever no chills no others.  THE PATIENT IS UNABLE TO WORK AT ALL BECAUSE OF HER ARTHRITIS      Review of Systems   Constitutional:  Negative for appetite change, chills and fever.   HENT:  Negative for congestion, ear pain, mouth sores, nosebleeds and trouble swallowing.    Eyes:  Negative for photophobia and discharge.   Respiratory:  Negative for chest tightness and shortness of breath.    Cardiovascular:  Negative for chest pain.   Gastrointestinal:  Negative for abdominal pain and vomiting.   Endocrine: Negative.    Genitourinary:  Negative for hematuria.   Musculoskeletal:         As per HPI   Skin:  Negative for rash.   Neurological:  Negative for weakness.       Objective:   /82 (BP Location: Right arm, Patient Position: Sitting, BP Method: Large (Automatic))   Pulse 82   Temp 98.7 °F (37.1 °C) (Oral)   Ht 5' 6" (1.676 m)   Wt 108.9 kg (240 lb)   LMP 08/08/2022 (Exact Date)   BMI 38.74 kg/m²      Physical Exam   Constitutional: She is oriented to person, place, and time. She appears well-developed and well-nourished. No distress.   HENT:   Head: Normocephalic and atraumatic.   Right Ear: External ear normal.   Left Ear: External ear normal.   Eyes: Pupils are equal, round, and " reactive to light.   Cardiovascular: Normal rate, regular rhythm and normal heart sounds.   Pulmonary/Chest: Breath sounds normal.   Abdominal: Soft. There is no abdominal tenderness.   Musculoskeletal:      Right shoulder: Tenderness present.      Left shoulder: Tenderness present.      Right elbow: Tenderness present.      Left elbow: Tenderness present.      Right wrist: Tenderness present.      Left wrist: Tenderness present.      Cervical back: Neck supple.      Right hip: Tenderness present.      Left hip: Tenderness present.      Right knee: Tenderness present.      Left knee: Tenderness present.      Right ankle: Tenderness present.      Left ankle: Tenderness present.   Lymphadenopathy:     She has no cervical adenopathy.   Neurological: She is alert and oriented to person, place, and time. She displays normal reflexes. No cranial nerve deficit or sensory deficit. She exhibits normal muscle tone. Coordination normal.   Skin: No rash noted. No erythema.   Vitals reviewed.      Right Side Rheumatological Exam     The patient is tender to palpation of the shoulder, elbow, wrist, knee, 1st PIP, 1st MCP, 2nd PIP, 2nd MCP, 3rd PIP, 3rd MCP, 4th PIP, 4th MCP, 5th PIP, hip, ankle, 1st MTP, 2nd MTP, 3rd MTP, 4th MTP, 5th MTP, 1st toe IP, 2nd toe IP, 3rd toe IP, 4th toe IP and 5th toe IP    Left Side Rheumatological Exam     The patient is tender to palpation of the shoulder, elbow, wrist, knee, 1st PIP, 1st MCP, 2nd PIP, 2nd MCP, 3rd PIP, 3rd MCP, 4th PIP, 4th MCP, 5th PIP, 5th MCP, hip, ankle, 1st MTP, 2nd MTP, 3rd MTP, 4th MTP, 5th MTP, 1st toe IP, 2nd toe IP, 3rd toe IP, 4th toe IP and 5th toe IP.       Completed Fibromyalgia exam 18/18 tender points.  No data to display    THE PATIENT IS UNABLE TO WORK AT ALL BECAUSE OF HER ARTHRITIS     Assessment:       1. Other systemic lupus erythematosus with other organ involvement    2. Fibromyalgia syndrome    3. Primary insomnia    4. Migraine without status  migrainosus, not intractable, unspecified migraine type            Medication List with Changes/Refills   New Medications    FOLIC ACID (FOLVITE) 1 MG TABLET    Take 1 tablet (1 mg total) by mouth once daily. After food       Start Date: 1/3/2023  End Date: 7/2/2023    HYDROCODONE-ACETAMINOPHEN (NORCO)  MG PER TABLET    Take 1 tablet by mouth 3 (three) times daily as needed for Pain.       Start Date: 1/3/2023  End Date: 2/2/2023    METHOTREXATE 2.5 MG TAB    Take 4 tablets (10 mg total) by mouth every 7 days. EVERY        TAKE 4 TAB TOGETHER AFTER SUPPER       Start Date: 1/3/2023  End Date: 7/2/2023    ZOLPIDEM (AMBIEN) 10 MG TAB    Take 1 tablet (10 mg total) by mouth every evening.       Start Date: 1/3/2023  End Date: 7/2/2023   Current Medications    ALPRAZOLAM (XANAX) 0.5 MG TABLET    Take 0.5 mg by mouth 2 (two) times daily.       Start Date: 6/6/2022  End Date: --    MAGNESIUM OXIDE (MAGOX) 400 MG (241.3 MG MAGNESIUM) TABLET    Take 1 tablet (400 mg total) by mouth once daily.       Start Date: 10/12/2022End Date: 10/12/2023    ONDANSETRON (ZOFRAN-ODT) 4 MG TBDL    Take 1 tablet (4 mg total) by mouth every 8 (eight) hours as needed (nausea).       Start Date: 8/31/2022 End Date: --   Discontinued Medications    DOCUSATE SODIUM (COLACE) 100 MG CAPSULE    Take 1 capsule (100 mg total) by mouth 2 (two) times daily.       Start Date: 9/6/2022  End Date: 1/3/2023    HYDROCODONE-ACETAMINOPHEN (NORCO) 5-325 MG PER TABLET    Take 1 tablet by mouth 2 (two) times daily as needed for Pain.       Start Date: 11/22/2022End Date: 1/3/2023    HYDROCODONE-ACETAMINOPHEN (NORCO) 7.5-325 MG PER TABLET    Take 1 tablet by mouth 2 (two) times daily as needed for Pain.       Start Date: 12/20/2022End Date: 1/3/2023         Plan:         Problem List Items Addressed This Visit    None  Visit Diagnoses       Other systemic lupus erythematosus with other organ involvement    -  Primary    Relevant Medications    folic acid  (FOLVITE) 1 MG tablet    methotrexate 2.5 MG Tab    zolpidem (AMBIEN) 10 mg Tab    HYDROcodone-acetaminophen (NORCO)  mg per tablet    Fibromyalgia syndrome        Relevant Medications    folic acid (FOLVITE) 1 MG tablet    methotrexate 2.5 MG Tab    zolpidem (AMBIEN) 10 mg Tab    HYDROcodone-acetaminophen (NORCO)  mg per tablet    Primary insomnia        Relevant Medications    folic acid (FOLVITE) 1 MG tablet    methotrexate 2.5 MG Tab    zolpidem (AMBIEN) 10 mg Tab    HYDROcodone-acetaminophen (NORCO)  mg per tablet    Migraine without status migrainosus, not intractable, unspecified migraine type        Relevant Medications    folic acid (FOLVITE) 1 MG tablet    methotrexate 2.5 MG Tab    zolpidem (AMBIEN) 10 mg Tab    HYDROcodone-acetaminophen (NORCO)  mg per tablet

## 2023-01-04 ENCOUNTER — TELEPHONE (OUTPATIENT)
Dept: RHEUMATOLOGY | Facility: CLINIC | Age: 43
End: 2023-01-04
Payer: MEDICAID

## 2023-01-04 ENCOUNTER — PATIENT MESSAGE (OUTPATIENT)
Dept: RHEUMATOLOGY | Facility: CLINIC | Age: 43
End: 2023-01-04
Payer: MEDICAID

## 2023-01-04 DIAGNOSIS — M32.19 OTHER SYSTEMIC LUPUS ERYTHEMATOSUS WITH OTHER ORGAN INVOLVEMENT: ICD-10-CM

## 2023-01-04 DIAGNOSIS — F51.01 PRIMARY INSOMNIA: ICD-10-CM

## 2023-01-04 DIAGNOSIS — M79.7 FIBROMYALGIA SYNDROME: ICD-10-CM

## 2023-01-04 DIAGNOSIS — G43.909 MIGRAINE WITHOUT STATUS MIGRAINOSUS, NOT INTRACTABLE, UNSPECIFIED MIGRAINE TYPE: ICD-10-CM

## 2023-01-04 RX ORDER — HYDROCODONE BITARTRATE AND ACETAMINOPHEN 10; 325 MG/1; MG/1
1 TABLET ORAL 3 TIMES DAILY PRN
Qty: 90 TABLET | Refills: 0 | Status: SHIPPED | OUTPATIENT
Start: 2023-01-04 | End: 2023-02-03

## 2023-01-04 NOTE — TELEPHONE ENCOUNTER
I did call to verify [facility] is out of stock of the norco .  And they are. I did put in a new script to Dr. Garland with an explanation on why we are sending another script. I did pend it to and as soon as it is signed it will be sent to another pharmacy. Patient advised thanks

## 2023-01-04 NOTE — TELEPHONE ENCOUNTER
----- Message from Whitley Mcconnell sent at 1/4/2023 10:47 AM CST -----  Regarding: Medication  Patient called stating the [facility] in [place] has the [place] on back order. Can script be canceled there and sent to [facility] in [place].

## 2023-01-19 ENCOUNTER — TELEPHONE (OUTPATIENT)
Dept: RHEUMATOLOGY | Facility: CLINIC | Age: 43
End: 2023-01-19
Payer: MEDICAID

## 2023-01-19 RX ORDER — ONDANSETRON 4 MG/1
4 TABLET, ORALLY DISINTEGRATING ORAL EVERY 8 HOURS PRN
Qty: 30 TABLET | Refills: 3 | Status: SHIPPED | OUTPATIENT
Start: 2023-01-19 | End: 2023-05-02 | Stop reason: SDUPTHER

## 2023-01-19 NOTE — TELEPHONE ENCOUNTER
----- Message from Tess Bernal sent at 1/19/2023  3:01 PM CST -----  Regarding: Medical Advice  Patient is requesting a prescription for Zofran.  She states her Lupus is making her nauseated. She has tried other medication and its not working

## 2023-02-07 ENCOUNTER — TELEPHONE (OUTPATIENT)
Dept: RHEUMATOLOGY | Facility: CLINIC | Age: 43
End: 2023-02-07

## 2023-02-07 ENCOUNTER — HOSPITAL ENCOUNTER (EMERGENCY)
Facility: HOSPITAL | Age: 43
Discharge: HOME | End: 2023-02-07
Attending: STUDENT IN AN ORGANIZED HEALTH CARE EDUCATION/TRAINING PROGRAM
Payer: MEDICAID

## 2023-02-07 VITALS
HEART RATE: 81 BPM | WEIGHT: 232.38 LBS | TEMPERATURE: 98 F | SYSTOLIC BLOOD PRESSURE: 148 MMHG | BODY MASS INDEX: 37.35 KG/M2 | RESPIRATION RATE: 16 BRPM | DIASTOLIC BLOOD PRESSURE: 89 MMHG | OXYGEN SATURATION: 100 % | HEIGHT: 66 IN

## 2023-02-07 DIAGNOSIS — M32.19 OTHER SYSTEMIC LUPUS ERYTHEMATOSUS WITH OTHER ORGAN INVOLVEMENT: Primary | ICD-10-CM

## 2023-02-07 DIAGNOSIS — G43.909 MIGRAINE WITHOUT STATUS MIGRAINOSUS, NOT INTRACTABLE, UNSPECIFIED MIGRAINE TYPE: ICD-10-CM

## 2023-02-07 DIAGNOSIS — H53.8 VISION BLURRING: ICD-10-CM

## 2023-02-07 DIAGNOSIS — H43.393 FLOATERS IN VISUAL FIELD, BILATERAL: Primary | ICD-10-CM

## 2023-02-07 DIAGNOSIS — M79.7 FIBROMYALGIA SYNDROME: ICD-10-CM

## 2023-02-07 PROCEDURE — 99284 EMERGENCY DEPT VISIT MOD MDM: CPT | Mod: 25

## 2023-02-07 RX ORDER — HYDROCODONE BITARTRATE AND ACETAMINOPHEN 5; 325 MG/1; MG/1
1 TABLET ORAL 3 TIMES DAILY PRN
Qty: 90 TABLET | Refills: 0 | Status: SHIPPED | OUTPATIENT
Start: 2023-02-07 | End: 2023-03-06 | Stop reason: SDUPTHER

## 2023-02-07 NOTE — TELEPHONE ENCOUNTER
----- Message from Tess Bernal sent at 2/7/2023  3:25 PM CST -----  Regarding: Medical Advice  Type:  Needs Medical Advice    Who Called: Nanda    Would the patient rather a call back or a response via [facility]? Call back  Best Call Back Number: [phone]  Additional Information: Wants a call back...regarding the norco10 she only wants 5mg 3x a day.
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
## 2023-02-07 NOTE — TELEPHONE ENCOUNTER
Patient is requesting a refill on her Norco but she would like to lower the dose to 5 mg 3 times a day instead of 10 mg.   Than you Dina

## 2023-02-07 NOTE — Clinical Note
"Nanda Sharif" Harsh was seen and treated in our emergency department on 2/7/2023.  She may return to work on 02/08/2023.       If you have any questions or concerns, please don't hesitate to call.       LPN    "

## 2023-02-07 NOTE — DISCHARGE INSTRUCTIONS
Report to Emergency Department if symptoms return or worsen; [facility] Clinic Within 1 to 2 days, It is important that you follow up with your primary care provider or specialist if indicated for further evaluation, workup, and treatment as necessary. The exam and treatment you received in Emergency Department was for an urgent problem and NOT INTENDED AS COMPLETE CARE. It is important that you FOLLOW UP with a doctor for ongoing care. If your symptoms become WORSE or you DO NOT IMPROVE and you are unable to reach your health care provider, you should RETURN to the Emergency Department. The Emergency Department provider has provided a PRELIMINARY INTERPRETATION of all your studies. A final interpretation may be done after you are discharged. If a change in your diagnosis or treatment is needed WE WILL CONTACT YOU. It is critical that we have a CURRENT PHONE NUMBER FOR YOU.

## 2023-03-06 DIAGNOSIS — M79.7 FIBROMYALGIA SYNDROME: ICD-10-CM

## 2023-03-06 DIAGNOSIS — G43.909 MIGRAINE WITHOUT STATUS MIGRAINOSUS, NOT INTRACTABLE, UNSPECIFIED MIGRAINE TYPE: ICD-10-CM

## 2023-03-06 DIAGNOSIS — M32.19 OTHER SYSTEMIC LUPUS ERYTHEMATOSUS WITH OTHER ORGAN INVOLVEMENT: ICD-10-CM

## 2023-03-06 RX ORDER — HYDROCODONE BITARTRATE AND ACETAMINOPHEN 5; 325 MG/1; MG/1
1 TABLET ORAL 3 TIMES DAILY PRN
Qty: 90 TABLET | Refills: 0 | Status: SHIPPED | OUTPATIENT
Start: 2023-03-06 | End: 2023-03-09 | Stop reason: SDUPTHER

## 2023-03-09 DIAGNOSIS — M32.19 OTHER SYSTEMIC LUPUS ERYTHEMATOSUS WITH OTHER ORGAN INVOLVEMENT: ICD-10-CM

## 2023-03-09 DIAGNOSIS — M79.7 FIBROMYALGIA SYNDROME: ICD-10-CM

## 2023-03-09 DIAGNOSIS — G43.909 MIGRAINE WITHOUT STATUS MIGRAINOSUS, NOT INTRACTABLE, UNSPECIFIED MIGRAINE TYPE: ICD-10-CM

## 2023-03-09 RX ORDER — HYDROCODONE BITARTRATE AND ACETAMINOPHEN 5; 325 MG/1; MG/1
1 TABLET ORAL 3 TIMES DAILY PRN
Qty: 90 TABLET | Refills: 0 | Status: SHIPPED | OUTPATIENT
Start: 2023-03-09 | End: 2023-03-29

## 2023-03-09 NOTE — TELEPHONE ENCOUNTER
Patient calling for Norco refill.  DLS:  01/03/2023   NOV:  04/12/2023  Rx was sent to [facility], but they do not have in stock.  New Rx pending to be send to [facility].

## 2023-03-10 ENCOUNTER — TELEPHONE (OUTPATIENT)
Dept: OPHTHALMOLOGY | Facility: CLINIC | Age: 43
End: 2023-03-10
Payer: MEDICAID

## 2023-03-29 ENCOUNTER — TELEPHONE (OUTPATIENT)
Dept: RHEUMATOLOGY | Facility: CLINIC | Age: 43
End: 2023-03-29
Payer: MEDICAID

## 2023-03-29 DIAGNOSIS — M79.7 FIBROMYALGIA SYNDROME: ICD-10-CM

## 2023-03-29 DIAGNOSIS — M32.19 OTHER SYSTEMIC LUPUS ERYTHEMATOSUS WITH OTHER ORGAN INVOLVEMENT: ICD-10-CM

## 2023-03-29 DIAGNOSIS — G43.909 MIGRAINE WITHOUT STATUS MIGRAINOSUS, NOT INTRACTABLE, UNSPECIFIED MIGRAINE TYPE: ICD-10-CM

## 2023-03-29 RX ORDER — HYDROCODONE BITARTRATE AND ACETAMINOPHEN 10; 325 MG/1; MG/1
1 TABLET ORAL 3 TIMES DAILY PRN
Qty: 90 TABLET | Refills: 0 | Status: SHIPPED | OUTPATIENT
Start: 2023-03-29 | End: 2023-05-02 | Stop reason: SDUPTHER

## 2023-03-29 NOTE — TELEPHONE ENCOUNTER
----- Message from Whitley Mcconnell sent at 3/29/2023 11:40 AM CDT -----  Regarding: medication refill  Pt would like to bump her hydrocodone from 5mg to 10mg. She has been taking additional tylenol which is not working for her. She uses [facility] pharmacy. [phone]

## 2023-03-29 NOTE — TELEPHONE ENCOUNTER
I increase it to 10 mg t.i.d. p.r.n..  This is the maximum I can give.  I called in to [facility] pharmacy.  Thank you BPH

## 2023-04-11 ENCOUNTER — PATIENT MESSAGE (OUTPATIENT)
Dept: RESEARCH | Facility: HOSPITAL | Age: 43
End: 2023-04-11
Payer: MEDICAID

## 2023-04-19 ENCOUNTER — PATIENT MESSAGE (OUTPATIENT)
Dept: RESEARCH | Facility: HOSPITAL | Age: 43
End: 2023-04-19
Payer: MEDICAID

## 2023-05-02 DIAGNOSIS — M79.7 FIBROMYALGIA SYNDROME: ICD-10-CM

## 2023-05-02 DIAGNOSIS — G43.909 MIGRAINE WITHOUT STATUS MIGRAINOSUS, NOT INTRACTABLE, UNSPECIFIED MIGRAINE TYPE: ICD-10-CM

## 2023-05-02 DIAGNOSIS — G43.909 MIGRAINE WITHOUT STATUS MIGRAINOSUS, NOT INTRACTABLE, UNSPECIFIED MIGRAINE TYPE: Primary | ICD-10-CM

## 2023-05-02 DIAGNOSIS — M32.19 OTHER SYSTEMIC LUPUS ERYTHEMATOSUS WITH OTHER ORGAN INVOLVEMENT: ICD-10-CM

## 2023-05-02 DIAGNOSIS — G43.111 INTRACTABLE MIGRAINE WITH AURA WITH STATUS MIGRAINOSUS: ICD-10-CM

## 2023-05-02 RX ORDER — HYDROCODONE BITARTRATE AND ACETAMINOPHEN 10; 325 MG/1; MG/1
1 TABLET ORAL 3 TIMES DAILY PRN
Qty: 90 TABLET | Refills: 0 | Status: SHIPPED | OUTPATIENT
Start: 2023-05-02 | End: 2023-06-01

## 2023-05-02 RX ORDER — ONDANSETRON 4 MG/1
4 TABLET, ORALLY DISINTEGRATING ORAL EVERY 8 HOURS PRN
Qty: 30 TABLET | Refills: 3 | Status: SHIPPED | OUTPATIENT
Start: 2023-05-02 | End: 2023-07-03 | Stop reason: SDUPTHER

## 2023-06-05 ENCOUNTER — PATIENT MESSAGE (OUTPATIENT)
Dept: OBSTETRICS AND GYNECOLOGY | Facility: CLINIC | Age: 43
End: 2023-06-05
Payer: MEDICAID

## 2023-06-05 ENCOUNTER — PATIENT MESSAGE (OUTPATIENT)
Dept: RHEUMATOLOGY | Facility: CLINIC | Age: 43
End: 2023-06-05
Payer: MEDICAID

## 2023-06-05 DIAGNOSIS — G43.909 MIGRAINE WITHOUT STATUS MIGRAINOSUS, NOT INTRACTABLE, UNSPECIFIED MIGRAINE TYPE: ICD-10-CM

## 2023-06-05 DIAGNOSIS — M32.19 OTHER SYSTEMIC LUPUS ERYTHEMATOSUS WITH OTHER ORGAN INVOLVEMENT: ICD-10-CM

## 2023-06-05 DIAGNOSIS — G43.111 INTRACTABLE MIGRAINE WITH AURA WITH STATUS MIGRAINOSUS: ICD-10-CM

## 2023-06-05 DIAGNOSIS — F51.01 PRIMARY INSOMNIA: ICD-10-CM

## 2023-06-05 DIAGNOSIS — M79.7 FIBROMYALGIA SYNDROME: ICD-10-CM

## 2023-06-05 RX ORDER — ZOLPIDEM TARTRATE 10 MG/1
10 TABLET ORAL NIGHTLY
Qty: 30 TABLET | Refills: 5 | Status: CANCELLED | OUTPATIENT
Start: 2023-06-05 | End: 2023-12-02

## 2023-06-05 RX ORDER — HYDROCODONE BITARTRATE AND ACETAMINOPHEN 10; 325 MG/1; MG/1
1 TABLET ORAL 3 TIMES DAILY PRN
Qty: 90 TABLET | Refills: 0 | Status: CANCELLED | OUTPATIENT
Start: 2023-06-05 | End: 2023-07-05

## 2023-06-05 RX ORDER — METHOTREXATE 2.5 MG/1
10 TABLET ORAL
Qty: 20 TABLET | Refills: 5 | Status: CANCELLED | OUTPATIENT
Start: 2023-06-05 | End: 2023-12-02

## 2023-06-05 RX ORDER — ONDANSETRON 4 MG/1
4 TABLET, ORALLY DISINTEGRATING ORAL EVERY 8 HOURS PRN
Qty: 30 TABLET | Refills: 3 | Status: CANCELLED | OUTPATIENT
Start: 2023-06-05

## 2023-06-05 RX ORDER — FOLIC ACID 1 MG/1
1 TABLET ORAL DAILY
Qty: 30 TABLET | Refills: 5 | Status: CANCELLED | OUTPATIENT
Start: 2023-06-05 | End: 2023-12-02

## 2023-06-05 NOTE — TELEPHONE ENCOUNTER
Patient missed last visit. Has to wait until next visit for refills. She has a visit coming up on 06/09/2023. She will have to wait until then to get her refills

## 2023-06-09 ENCOUNTER — OFFICE VISIT (OUTPATIENT)
Dept: RHEUMATOLOGY | Facility: CLINIC | Age: 43
End: 2023-06-09
Payer: MEDICAID

## 2023-06-09 VITALS
BODY MASS INDEX: 35.62 KG/M2 | HEART RATE: 80 BPM | DIASTOLIC BLOOD PRESSURE: 93 MMHG | WEIGHT: 221.63 LBS | SYSTOLIC BLOOD PRESSURE: 136 MMHG | TEMPERATURE: 98 F | HEIGHT: 66 IN

## 2023-06-09 DIAGNOSIS — M32.9 LUPUS: Primary | ICD-10-CM

## 2023-06-09 DIAGNOSIS — M79.7 FIBROMYALGIA SYNDROME: ICD-10-CM

## 2023-06-09 DIAGNOSIS — G43.101 MIGRAINE WITH AURA AND WITH STATUS MIGRAINOSUS, NOT INTRACTABLE: ICD-10-CM

## 2023-06-09 DIAGNOSIS — G47.00 INSOMNIA, UNSPECIFIED TYPE: ICD-10-CM

## 2023-06-09 PROCEDURE — 3075F SYST BP GE 130 - 139MM HG: CPT | Mod: CPTII,,, | Performed by: INTERNAL MEDICINE

## 2023-06-09 PROCEDURE — 1159F PR MEDICATION LIST DOCUMENTED IN MEDICAL RECORD: ICD-10-PCS | Mod: CPTII,,, | Performed by: INTERNAL MEDICINE

## 2023-06-09 PROCEDURE — 1159F MED LIST DOCD IN RCRD: CPT | Mod: CPTII,,, | Performed by: INTERNAL MEDICINE

## 2023-06-09 PROCEDURE — 99999 PR PBB SHADOW E&M-EST. PATIENT-LVL III: ICD-10-PCS | Mod: PBBFAC,,, | Performed by: INTERNAL MEDICINE

## 2023-06-09 PROCEDURE — 3075F PR MOST RECENT SYSTOLIC BLOOD PRESS GE 130-139MM HG: ICD-10-PCS | Mod: CPTII,,, | Performed by: INTERNAL MEDICINE

## 2023-06-09 PROCEDURE — 99999 PR PBB SHADOW E&M-EST. PATIENT-LVL III: CPT | Mod: PBBFAC,,, | Performed by: INTERNAL MEDICINE

## 2023-06-09 PROCEDURE — 99214 PR OFFICE/OUTPT VISIT, EST, LEVL IV, 30-39 MIN: ICD-10-PCS | Mod: S$PBB,,, | Performed by: INTERNAL MEDICINE

## 2023-06-09 PROCEDURE — 3080F PR MOST RECENT DIASTOLIC BLOOD PRESSURE >= 90 MM HG: ICD-10-PCS | Mod: CPTII,,, | Performed by: INTERNAL MEDICINE

## 2023-06-09 PROCEDURE — 3008F PR BODY MASS INDEX (BMI) DOCUMENTED: ICD-10-PCS | Mod: CPTII,,, | Performed by: INTERNAL MEDICINE

## 2023-06-09 PROCEDURE — 99213 OFFICE O/P EST LOW 20 MIN: CPT | Mod: PBBFAC | Performed by: INTERNAL MEDICINE

## 2023-06-09 PROCEDURE — 3080F DIAST BP >= 90 MM HG: CPT | Mod: CPTII,,, | Performed by: INTERNAL MEDICINE

## 2023-06-09 PROCEDURE — 99214 OFFICE O/P EST MOD 30 MIN: CPT | Mod: S$PBB,,, | Performed by: INTERNAL MEDICINE

## 2023-06-09 PROCEDURE — 3008F BODY MASS INDEX DOCD: CPT | Mod: CPTII,,, | Performed by: INTERNAL MEDICINE

## 2023-06-09 RX ORDER — ZOLPIDEM TARTRATE 10 MG/1
10 TABLET ORAL NIGHTLY
Qty: 30 TABLET | Refills: 5 | Status: SHIPPED | OUTPATIENT
Start: 2023-06-09 | End: 2023-07-03 | Stop reason: SDUPTHER

## 2023-06-09 RX ORDER — MYCOPHENOLATE MOFETIL 500 MG/1
1000 TABLET ORAL
Qty: 60 TABLET | Refills: 11 | Status: SHIPPED | OUTPATIENT
Start: 2023-06-09 | End: 2023-10-06 | Stop reason: SDUPTHER

## 2023-06-09 RX ORDER — HYDROCODONE BITARTRATE AND ACETAMINOPHEN 5; 325 MG/1; MG/1
1 TABLET ORAL EVERY 6 HOURS PRN
COMMUNITY
Start: 2023-04-19 | End: 2023-06-09

## 2023-06-09 RX ORDER — ONDANSETRON 4 MG/1
4 TABLET, FILM COATED ORAL EVERY 8 HOURS PRN
COMMUNITY
Start: 2023-04-19 | End: 2023-06-09 | Stop reason: SDUPTHER

## 2023-06-09 RX ORDER — OXYCODONE AND ACETAMINOPHEN 10; 325 MG/1; MG/1
1 TABLET ORAL 3 TIMES DAILY PRN
Qty: 90 TABLET | Refills: 0 | Status: SHIPPED | OUTPATIENT
Start: 2023-06-09 | End: 2023-07-03 | Stop reason: SDUPTHER

## 2023-06-09 NOTE — PROGRESS NOTES
"Subjective:       Patient ID: Nanda House is a 43 y.o. female.    Chief Complaint: Pain (The pt is passing out. Pain in back/ribs)    The patient is complaining of joint pain involving the MCP PIP wrist elbow shoulders hips knees and ankles bilaterally.  The pain is 9/10 in intensity dull in quality and continuous.  That is associated with a morning stiffness lasting for more than 60 minutes.  Is also having difficulty maintaining a good night of sleep.  This has been associated with myalgias.  Muscle aches are 9/10 in intensity dull in quality and continuous.  They are associated with fatigue.  No fever no chills no others.  SYSTEMIC LUPUS RESISTANT TO CELLCEPT METHOTREXATE PLAQUENIL STEROIDS AND NSAIDS  Labs checked during this visit       Review of Systems   Constitutional:  Negative for appetite change, chills and fever.   HENT:  Negative for congestion, ear pain, mouth sores, nosebleeds and trouble swallowing.    Eyes:  Negative for photophobia and discharge.   Respiratory:  Negative for chest tightness and shortness of breath.    Cardiovascular:  Negative for chest pain.   Gastrointestinal:  Negative for abdominal pain and vomiting.   Endocrine: Negative.    Genitourinary:  Negative for hematuria.   Musculoskeletal:         As per HPI   Skin:  Negative for rash.   Neurological:  Negative for weakness.       Objective:   BP (!) 136/93 (BP Location: Left arm)   Pulse 80   Temp 98 °F (36.7 °C) (Oral)   Ht 5' 6" (1.676 m)   Wt 100.5 kg (221 lb 9.6 oz)   LMP 08/08/2022 (Exact Date)   BMI 35.77 kg/m²      Physical Exam   Constitutional: She is oriented to person, place, and time. She appears well-developed and well-nourished. No distress.   HENT:   Head: Normocephalic and atraumatic.   Right Ear: External ear normal.   Left Ear: External ear normal.   Eyes: Pupils are equal, round, and reactive to light.   Cardiovascular: Normal rate, regular rhythm and normal heart sounds.   Pulmonary/Chest: Breath sounds " normal.   Abdominal: Soft. There is no abdominal tenderness.   Musculoskeletal:      Right shoulder: Tenderness present.      Left shoulder: Tenderness present.      Right elbow: Tenderness present.      Left elbow: Tenderness present.      Right wrist: Tenderness present.      Left wrist: Tenderness present.      Cervical back: Neck supple.      Right hip: Tenderness present.      Left hip: Tenderness present.      Right knee: Tenderness present.      Left knee: Tenderness present.      Right ankle: Tenderness present.      Left ankle: Tenderness present.   Lymphadenopathy:     She has no cervical adenopathy.   Neurological: She is alert and oriented to person, place, and time. She displays normal reflexes. No cranial nerve deficit or sensory deficit. She exhibits normal muscle tone. Coordination normal.   Skin: No rash noted. No erythema.   Vitals reviewed.      Right Side Rheumatological Exam     The patient is tender to palpation of the shoulder, elbow, wrist, knee, 1st PIP, 1st MCP, 2nd PIP, 2nd MCP, 3rd PIP, 3rd MCP, 4th PIP, 4th MCP, 5th PIP, hip, ankle, 1st MTP, 2nd MTP, 3rd MTP, 4th MTP, 5th MTP, 1st toe IP, 2nd toe IP, 3rd toe IP, 4th toe IP and 5th toe IP    Left Side Rheumatological Exam     The patient is tender to palpation of the shoulder, elbow, wrist, knee, 1st PIP, 1st MCP, 2nd PIP, 2nd MCP, 3rd PIP, 3rd MCP, 4th PIP, 4th MCP, 5th PIP, 5th MCP, hip, ankle, 1st MTP, 2nd MTP, 3rd MTP, 4th MTP, 5th MTP, 1st toe IP, 2nd toe IP, 3rd toe IP, 4th toe IP and 5th toe IP.       Completed Fibromyalgia exam 18/18 tender points.  No data to display     Assessment:       1. Lupus    2. Fibromyalgia syndrome    3. Insomnia, unspecified type    4. Migraine with aura and with status migrainosus, not intractable          Medication List with Changes/Refills   New Medications    BELIMUMAB (BENLYSTA) 200 MG/ML ATIN    Inject 1 mL (200 mg total) into the skin every 7 days.       Start Date: 6/9/2023  End Date: --     MYCOPHENOLATE (CELLCEPT) 500 MG TAB    Take 2 tablets (1,000 mg total) by mouth daily with dinner or evening meal.       Start Date: 6/9/2023  End Date: 6/8/2024    OXYCODONE-ACETAMINOPHEN (PERCOCET)  MG PER TABLET    Take 1 tablet by mouth 3 (three) times daily as needed for Pain.       Start Date: 6/9/2023  End Date: 7/9/2023    ZOLPIDEM (AMBIEN) 10 MG TAB    Take 1 tablet (10 mg total) by mouth every evening.       Start Date: 6/9/2023  End Date: 12/6/2023   Current Medications    ALPRAZOLAM (XANAX) 0.5 MG TABLET    Take 0.5 mg by mouth 2 (two) times daily.       Start Date: 6/6/2022  End Date: --    MAGNESIUM OXIDE (MAGOX) 400 MG (241.3 MG MAGNESIUM) TABLET    Take 1 tablet (400 mg total) by mouth once daily.       Start Date: 10/12/2022End Date: 10/12/2023    ONDANSETRON (ZOFRAN) 4 MG TABLET    Take 4 mg by mouth every 8 (eight) hours as needed.       Start Date: 4/19/2023 End Date: --    ONDANSETRON (ZOFRAN-ODT) 4 MG TBDL    Take 1 tablet (4 mg total) by mouth every 8 (eight) hours as needed (nausea).       Start Date: 5/2/2023  End Date: --    ZOLPIDEM (AMBIEN) 10 MG TAB    Take 1 tablet (10 mg total) by mouth every evening.       Start Date: 1/3/2023  End Date: 7/2/2023   Discontinued Medications    FOLIC ACID (FOLVITE) 1 MG TABLET    Take 1 tablet (1 mg total) by mouth once daily. After food       Start Date: 1/3/2023  End Date: 6/9/2023    HYDROCODONE-ACETAMINOPHEN (NORCO) 5-325 MG PER TABLET    Take 1 tablet by mouth every 6 (six) hours as needed.       Start Date: 4/19/2023 End Date: 6/9/2023    METHOTREXATE 2.5 MG TAB    Take 4 tablets (10 mg total) by mouth every 7 days. EVERY        TAKE 4 TAB TOGETHER AFTER SUPPER       Start Date: 1/3/2023  End Date: 6/9/2023         Plan:         Problem List Items Addressed This Visit          Neuro    Migraine with aura and with status migrainosus, not intractable    Relevant Medications    mycophenolate (CELLCEPT) 500 mg Tab    belimumab (BENLYSTA) 200  mg/mL AtIn    oxyCODONE-acetaminophen (PERCOCET)  mg per tablet    Other Relevant Orders    Quantiferon Gold TB    CBC Auto Differential    Comprehensive Metabolic Panel    CRP, High Sensitivity    Vitamin D    Antinuclear Ab, HEp-2 Substrate    Hepatitis B Surface Antigen    Hepatitis C Antibody    TSH    T4, Free    DSDNA    C4 Complement    C3 Complement    Urinalysis    Protein/Creatinine Ratio, Urine       Immunology/Multi System    Lupus - Primary    Relevant Medications    mycophenolate (CELLCEPT) 500 mg Tab    belimumab (BENLYSTA) 200 mg/mL AtIn    oxyCODONE-acetaminophen (PERCOCET)  mg per tablet    Other Relevant Orders    Quantiferon Gold TB    CBC Auto Differential    Comprehensive Metabolic Panel    CRP, High Sensitivity    Vitamin D    Antinuclear Ab, HEp-2 Substrate    Hepatitis B Surface Antigen    Hepatitis C Antibody    TSH    T4, Free    DSDNA    C4 Complement    C3 Complement    Urinalysis    Protein/Creatinine Ratio, Urine       Orthopedic    Fibromyalgia syndrome    Relevant Medications    mycophenolate (CELLCEPT) 500 mg Tab    belimumab (BENLYSTA) 200 mg/mL AtIn    oxyCODONE-acetaminophen (PERCOCET)  mg per tablet    Other Relevant Orders    Quantiferon Gold TB    CBC Auto Differential    Comprehensive Metabolic Panel    CRP, High Sensitivity    Vitamin D    Antinuclear Ab, HEp-2 Substrate    Hepatitis B Surface Antigen    Hepatitis C Antibody    TSH    T4, Free    DSDNA    C4 Complement    C3 Complement    Urinalysis    Protein/Creatinine Ratio, Urine       Other    Insomnia    Relevant Medications    mycophenolate (CELLCEPT) 500 mg Tab    belimumab (BENLYSTA) 200 mg/mL AtIn    oxyCODONE-acetaminophen (PERCOCET)  mg per tablet    zolpidem (AMBIEN) 10 mg Tab    Other Relevant Orders    Quantiferon Gold TB    CBC Auto Differential    Comprehensive Metabolic Panel    CRP, High Sensitivity    Vitamin D    Antinuclear Ab, HEp-2 Substrate    Hepatitis B Surface Antigen     Hepatitis C Antibody    TSH    T4, Free    DSDNA    C4 Complement    C3 Complement    Urinalysis    Protein/Creatinine Ratio, Urine

## 2023-06-12 ENCOUNTER — TELEPHONE (OUTPATIENT)
Dept: RHEUMATOLOGY | Facility: CLINIC | Age: 43
End: 2023-06-12
Payer: MEDICAID

## 2023-06-15 ENCOUNTER — TELEPHONE (OUTPATIENT)
Dept: RHEUMATOLOGY | Facility: CLINIC | Age: 43
End: 2023-06-15
Payer: MEDICAID

## 2023-06-15 NOTE — TELEPHONE ENCOUNTER
----- Message from Serjio Garland MD sent at 6/15/2023  3:20 PM CDT -----  Regarding: FW: Medication concerns  Can she try to do the shot once a week at night time? If she is not on anti depressants, we can get on an anti depressant. Thanks  Thanks    ----- Message -----  From: Nena Ruggiero MA  Sent: 6/15/2023   2:21 PM CDT  To: Serjio Garland MD  Subject: FW: Medication concerns                            ----- Message -----  From: Whitley Mcconnell  Sent: 6/15/2023   2:07 PM CDT  To: , #  Subject: Medication concerns                              Patient called stating since she begin the Benlysta she has been having suicidal thoughts, depression and nightmares. She do not know what to do. Please return her call @ [phone]

## 2023-06-15 NOTE — TELEPHONE ENCOUNTER
She is wanting to stop the injection immediately , she wasn't depressed before or having nightmares before starting the injection, she is also slurring her words . She isn't sure what the next step is she is talking about blood transfusions. She did have blood work at [facility] and they are going to fax the result to us . Please Advise. Thanks

## 2023-06-19 ENCOUNTER — TELEPHONE (OUTPATIENT)
Dept: RHEUMATOLOGY | Facility: CLINIC | Age: 43
End: 2023-06-19
Payer: MEDICAID

## 2023-06-19 NOTE — TELEPHONE ENCOUNTER
The pt would like to back Norco. She can not handle oxycodone. She would like to send to [facility] pharmacy.    detailed exam

## 2023-06-22 ENCOUNTER — PATIENT MESSAGE (OUTPATIENT)
Dept: RHEUMATOLOGY | Facility: CLINIC | Age: 43
End: 2023-06-22
Payer: MEDICAID

## 2023-06-22 ENCOUNTER — TELEPHONE (OUTPATIENT)
Dept: RHEUMATOLOGY | Facility: CLINIC | Age: 43
End: 2023-06-22
Payer: MEDICAID

## 2023-06-22 NOTE — TELEPHONE ENCOUNTER
The blood test is normal.  I do not think we need to do anything about the bleeding/coagulation issues.  Please continue the current medications.  Thank you BPH

## 2023-06-22 NOTE — TELEPHONE ENCOUNTER
----- Message from Whitley Mcconnell sent at 6/22/2023 10:24 AM CDT -----  Regarding: Medication Update  Patient called stating she is presently on the Benlysta and Oxycodone and is doing better. Just wanted to give an update.

## 2023-06-27 NOTE — TELEPHONE ENCOUNTER
Please inform the patient that her blood tests is excellent.  The TB test is negative.  She may continue with the treatment plan for during the visit.  Thank you BH

## 2023-07-03 DIAGNOSIS — M32.9 LUPUS: ICD-10-CM

## 2023-07-03 DIAGNOSIS — G47.00 INSOMNIA, UNSPECIFIED TYPE: ICD-10-CM

## 2023-07-03 DIAGNOSIS — G43.101 MIGRAINE WITH AURA AND WITH STATUS MIGRAINOSUS, NOT INTRACTABLE: ICD-10-CM

## 2023-07-03 DIAGNOSIS — G43.909 MIGRAINE WITHOUT STATUS MIGRAINOSUS, NOT INTRACTABLE, UNSPECIFIED MIGRAINE TYPE: ICD-10-CM

## 2023-07-03 DIAGNOSIS — M79.7 FIBROMYALGIA SYNDROME: ICD-10-CM

## 2023-07-03 DIAGNOSIS — G43.111 INTRACTABLE MIGRAINE WITH AURA WITH STATUS MIGRAINOSUS: ICD-10-CM

## 2023-07-05 RX ORDER — OXYCODONE AND ACETAMINOPHEN 10; 325 MG/1; MG/1
1 TABLET ORAL 3 TIMES DAILY PRN
Qty: 90 TABLET | Refills: 0 | Status: SHIPPED | OUTPATIENT
Start: 2023-07-05 | End: 2023-08-04

## 2023-07-05 RX ORDER — MYCOPHENOLATE MOFETIL 500 MG/1
1000 TABLET ORAL
Qty: 60 TABLET | Refills: 11 | Status: CANCELLED | OUTPATIENT
Start: 2023-07-05 | End: 2024-07-04

## 2023-07-05 RX ORDER — ZOLPIDEM TARTRATE 10 MG/1
10 TABLET ORAL NIGHTLY
Qty: 30 TABLET | Refills: 5 | Status: SHIPPED | OUTPATIENT
Start: 2023-07-05 | End: 2024-01-01

## 2023-07-05 RX ORDER — ONDANSETRON 4 MG/1
4 TABLET, ORALLY DISINTEGRATING ORAL EVERY 8 HOURS PRN
Qty: 30 TABLET | Refills: 3 | Status: SHIPPED | OUTPATIENT
Start: 2023-07-05 | End: 2023-10-06 | Stop reason: SDUPTHER

## 2023-07-25 ENCOUNTER — TELEPHONE (OUTPATIENT)
Dept: RHEUMATOLOGY | Facility: CLINIC | Age: 43
End: 2023-07-25
Payer: MEDICAID

## 2023-07-25 NOTE — TELEPHONE ENCOUNTER
----- Message from Whitley Mcconnell sent at 7/25/2023 10:05 AM CDT -----  Regarding: medication refill  Patient call requesting a refill for Ambien. [facility] [place].      Recommended observation.

## 2023-08-07 DIAGNOSIS — M32.9 LUPUS: Primary | ICD-10-CM

## 2023-08-07 DIAGNOSIS — M79.7 FIBROMYALGIA SYNDROME: ICD-10-CM

## 2023-08-07 DIAGNOSIS — G43.909 MIGRAINE WITHOUT STATUS MIGRAINOSUS, NOT INTRACTABLE, UNSPECIFIED MIGRAINE TYPE: ICD-10-CM

## 2023-08-07 DIAGNOSIS — M32.19 OTHER SYSTEMIC LUPUS ERYTHEMATOSUS WITH OTHER ORGAN INVOLVEMENT: ICD-10-CM

## 2023-08-07 RX ORDER — HYDROCODONE BITARTRATE AND ACETAMINOPHEN 10; 325 MG/1; MG/1
1 TABLET ORAL 3 TIMES DAILY PRN
Qty: 90 TABLET | Refills: 0 | Status: SHIPPED | OUTPATIENT
Start: 2023-08-07 | End: 2023-09-05 | Stop reason: SDUPTHER

## 2023-08-07 NOTE — TELEPHONE ENCOUNTER
Pt states she would like to switch back to the hydrocodone because the oxycodone is not helping.     Lov 06/09/23  Nov 10/16/23

## 2023-09-05 DIAGNOSIS — M32.9 LUPUS: ICD-10-CM

## 2023-09-05 DIAGNOSIS — G43.111 INTRACTABLE MIGRAINE WITH AURA WITH STATUS MIGRAINOSUS: ICD-10-CM

## 2023-09-05 DIAGNOSIS — M79.7 FIBROMYALGIA SYNDROME: ICD-10-CM

## 2023-09-05 DIAGNOSIS — M32.19 OTHER SYSTEMIC LUPUS ERYTHEMATOSUS WITH OTHER ORGAN INVOLVEMENT: ICD-10-CM

## 2023-09-05 DIAGNOSIS — G43.909 MIGRAINE WITHOUT STATUS MIGRAINOSUS, NOT INTRACTABLE, UNSPECIFIED MIGRAINE TYPE: ICD-10-CM

## 2023-09-05 RX ORDER — ONDANSETRON 4 MG/1
4 TABLET, ORALLY DISINTEGRATING ORAL EVERY 8 HOURS PRN
Qty: 30 TABLET | Refills: 3 | Status: CANCELLED | OUTPATIENT
Start: 2023-09-05

## 2023-09-05 RX ORDER — HYDROCODONE BITARTRATE AND ACETAMINOPHEN 10; 325 MG/1; MG/1
1 TABLET ORAL 3 TIMES DAILY PRN
Qty: 90 TABLET | Refills: 0 | Status: SHIPPED | OUTPATIENT
Start: 2023-09-05 | End: 2023-09-06 | Stop reason: SDUPTHER

## 2023-09-05 NOTE — TELEPHONE ENCOUNTER
----- Message from Whitley Mcconnell sent at 9/5/2023  9:59 AM CDT -----  Regarding: Medication refill  Patient called requesting a refill for Oxycodone and also stated her PCP is closing his practice and she need a script for Xanax (1mg) sent to [facility] IN [place].

## 2023-09-06 DIAGNOSIS — M32.19 OTHER SYSTEMIC LUPUS ERYTHEMATOSUS WITH OTHER ORGAN INVOLVEMENT: ICD-10-CM

## 2023-09-06 DIAGNOSIS — G43.909 MIGRAINE WITHOUT STATUS MIGRAINOSUS, NOT INTRACTABLE, UNSPECIFIED MIGRAINE TYPE: ICD-10-CM

## 2023-09-06 DIAGNOSIS — M79.7 FIBROMYALGIA SYNDROME: ICD-10-CM

## 2023-09-06 DIAGNOSIS — M32.9 LUPUS: ICD-10-CM

## 2023-09-06 RX ORDER — HYDROCODONE BITARTRATE AND ACETAMINOPHEN 10; 325 MG/1; MG/1
1 TABLET ORAL 3 TIMES DAILY PRN
Qty: 90 TABLET | Refills: 0 | Status: SHIPPED | OUTPATIENT
Start: 2023-09-06 | End: 2023-10-06 | Stop reason: SDUPTHER

## 2023-09-06 NOTE — TELEPHONE ENCOUNTER
----- Message from Whitley Mcconnell sent at 9/5/2023  9:59 AM CDT -----  Regarding: Medication refill  Patient called requesting a refill for Oxycodone and also stated her PCP is closing his practice and she need a script for Xanax (1mg) sent to [facility] IN [place].

## 2023-09-06 NOTE — TELEPHONE ENCOUNTER
Tried calling patient to let her know she will have to get with a PCP for Xanax. Looking at the history I only see 0.5mg and it was discontinued by the dr that prescribed it. We cannot prescribe the Xanax at this time. The number is not allowing me to leave a message for her it says the patient is unavailable.

## 2023-09-06 NOTE — TELEPHONE ENCOUNTER
----- Message from Whitley Mcconnell sent at 9/6/2023  2:02 PM CDT -----  Regarding: Medication  Medication refill sent on yesterday need to be sent to [facility] on [address]. Pharmacy used on yesterday do not have the medication in stock.

## 2023-09-12 ENCOUNTER — TELEPHONE (OUTPATIENT)
Dept: RHEUMATOLOGY | Facility: CLINIC | Age: 43
End: 2023-09-12
Payer: MEDICAID

## 2023-09-12 DIAGNOSIS — F41.0 PANIC ATTACK: Primary | ICD-10-CM

## 2023-09-12 RX ORDER — ALPRAZOLAM 0.5 MG/1
0.5 TABLET ORAL 2 TIMES DAILY
Qty: 60 TABLET | Refills: 5 | Status: SHIPPED | OUTPATIENT
Start: 2023-09-12 | End: 2023-11-06 | Stop reason: SDUPTHER

## 2023-09-12 NOTE — TELEPHONE ENCOUNTER
The patient is calling the office asking if you could refill her xanax until she finds another PCP. She states that she is having panic attacks do to her having covid and lupus.  Please advise.

## 2023-09-13 NOTE — TELEPHONE ENCOUNTER
Discontinue keflex, start macrobid. Dr villanueva to pharm  
Pt sister called pt took her antibiotic and had up set stomach she is requesting something different.  
Detail Level: Detailed
Quality 226: Preventive Care And Screening: Tobacco Use: Screening And Cessation Intervention: Patient screened for tobacco use and is an ex/non-smoker

## 2023-09-21 ENCOUNTER — TELEPHONE (OUTPATIENT)
Dept: RHEUMATOLOGY | Facility: CLINIC | Age: 43
End: 2023-09-21
Payer: MEDICAID

## 2023-09-21 NOTE — TELEPHONE ENCOUNTER
Spoke with patient and discussed the Disability paperwork with her . I did fax this paper work to [facility] and the patient has been advised.     Fax: [phone]

## 2023-09-28 ENCOUNTER — TELEPHONE (OUTPATIENT)
Dept: RHEUMATOLOGY | Facility: CLINIC | Age: 43
End: 2023-09-28
Payer: MEDICAID

## 2023-09-28 NOTE — TELEPHONE ENCOUNTER
I did call patient to follow up with her about her disability paperwork and if there is any further assistance she is needing . I did leave a detailed vm for her to call the office back .

## 2023-10-06 DIAGNOSIS — G47.00 INSOMNIA, UNSPECIFIED TYPE: ICD-10-CM

## 2023-10-06 DIAGNOSIS — M32.9 LUPUS: ICD-10-CM

## 2023-10-06 DIAGNOSIS — M32.19 OTHER SYSTEMIC LUPUS ERYTHEMATOSUS WITH OTHER ORGAN INVOLVEMENT: ICD-10-CM

## 2023-10-06 DIAGNOSIS — G43.101 MIGRAINE WITH AURA AND WITH STATUS MIGRAINOSUS, NOT INTRACTABLE: ICD-10-CM

## 2023-10-06 DIAGNOSIS — M79.7 FIBROMYALGIA SYNDROME: ICD-10-CM

## 2023-10-06 DIAGNOSIS — G43.111 INTRACTABLE MIGRAINE WITH AURA WITH STATUS MIGRAINOSUS: ICD-10-CM

## 2023-10-06 DIAGNOSIS — G43.909 MIGRAINE WITHOUT STATUS MIGRAINOSUS, NOT INTRACTABLE, UNSPECIFIED MIGRAINE TYPE: ICD-10-CM

## 2023-10-06 RX ORDER — MYCOPHENOLATE MOFETIL 500 MG/1
1000 TABLET ORAL
Qty: 60 TABLET | Refills: 11 | Status: SHIPPED | OUTPATIENT
Start: 2023-10-06 | End: 2024-10-05

## 2023-10-06 RX ORDER — ONDANSETRON 4 MG/1
4 TABLET, ORALLY DISINTEGRATING ORAL EVERY 8 HOURS PRN
Qty: 30 TABLET | Refills: 3 | Status: SHIPPED | OUTPATIENT
Start: 2023-10-06

## 2023-10-06 RX ORDER — HYDROCODONE BITARTRATE AND ACETAMINOPHEN 10; 325 MG/1; MG/1
1 TABLET ORAL 3 TIMES DAILY PRN
Qty: 90 TABLET | Refills: 0 | Status: SHIPPED | OUTPATIENT
Start: 2023-10-06 | End: 2023-11-06 | Stop reason: SDUPTHER

## 2023-11-06 DIAGNOSIS — M32.19 OTHER SYSTEMIC LUPUS ERYTHEMATOSUS WITH OTHER ORGAN INVOLVEMENT: ICD-10-CM

## 2023-11-06 DIAGNOSIS — M79.7 FIBROMYALGIA SYNDROME: ICD-10-CM

## 2023-11-06 DIAGNOSIS — M32.9 LUPUS: ICD-10-CM

## 2023-11-06 DIAGNOSIS — F41.0 PANIC ATTACK: ICD-10-CM

## 2023-11-06 DIAGNOSIS — G43.909 MIGRAINE WITHOUT STATUS MIGRAINOSUS, NOT INTRACTABLE, UNSPECIFIED MIGRAINE TYPE: ICD-10-CM

## 2023-11-06 NOTE — TELEPHONE ENCOUNTER
Patient called requesting xanax 1mg Bid . This is how she was taking it before. Please Advise. Thanks

## 2023-11-07 DIAGNOSIS — M32.9 LUPUS: ICD-10-CM

## 2023-11-07 DIAGNOSIS — F41.0 PANIC ATTACK: ICD-10-CM

## 2023-11-07 DIAGNOSIS — G47.00 INSOMNIA, UNSPECIFIED TYPE: ICD-10-CM

## 2023-11-07 DIAGNOSIS — G43.909 MIGRAINE WITHOUT STATUS MIGRAINOSUS, NOT INTRACTABLE, UNSPECIFIED MIGRAINE TYPE: ICD-10-CM

## 2023-11-07 DIAGNOSIS — M79.7 FIBROMYALGIA SYNDROME: ICD-10-CM

## 2023-11-07 DIAGNOSIS — M32.19 OTHER SYSTEMIC LUPUS ERYTHEMATOSUS WITH OTHER ORGAN INVOLVEMENT: ICD-10-CM

## 2023-11-07 RX ORDER — ZOLPIDEM TARTRATE 10 MG/1
10 TABLET ORAL NIGHTLY
Qty: 30 TABLET | Refills: 5 | OUTPATIENT
Start: 2023-11-07 | End: 2024-05-05

## 2023-11-07 RX ORDER — ALPRAZOLAM 0.5 MG/1
0.5 TABLET ORAL 2 TIMES DAILY
Qty: 60 TABLET | Refills: 5 | OUTPATIENT
Start: 2023-11-07

## 2023-11-07 NOTE — TELEPHONE ENCOUNTER
Patient canceled last apt not seen since 06/09/2023 but I pended the pain medication in case. Was not sure you wanted to fill  Thank you

## 2023-11-07 NOTE — TELEPHONE ENCOUNTER
Checked. She was getting Xanax 1 mg BID from previous PCP.   Xanax 1 mg BID pended for Dr. Garland

## 2023-11-08 RX ORDER — HYDROCODONE BITARTRATE AND ACETAMINOPHEN 10; 325 MG/1; MG/1
1 TABLET ORAL 3 TIMES DAILY PRN
Qty: 90 TABLET | Refills: 0 | Status: SHIPPED | OUTPATIENT
Start: 2023-11-08

## 2023-11-08 RX ORDER — ALPRAZOLAM 1 MG/1
1 TABLET ORAL 2 TIMES DAILY
Qty: 60 TABLET | Refills: 5 | Status: SHIPPED | OUTPATIENT
Start: 2023-11-08

## 2023-11-09 RX ORDER — HYDROCODONE BITARTRATE AND ACETAMINOPHEN 10; 325 MG/1; MG/1
1 TABLET ORAL 3 TIMES DAILY PRN
Qty: 90 TABLET | Refills: 0 | Status: SHIPPED | OUTPATIENT
Start: 2023-11-09

## 2024-10-01 ENCOUNTER — APPOINTMENT (OUTPATIENT)
Dept: OBSTETRICS AND GYNECOLOGY | Facility: CLINIC | Age: 44
End: 2024-10-01
Payer: MEDICAID

## 2024-10-01 ENCOUNTER — OFFICE VISIT (OUTPATIENT)
Dept: OBSTETRICS AND GYNECOLOGY | Facility: CLINIC | Age: 44
End: 2024-10-01
Payer: MEDICAID

## 2024-10-01 VITALS
SYSTOLIC BLOOD PRESSURE: 121 MMHG | BODY MASS INDEX: 40.66 KG/M2 | DIASTOLIC BLOOD PRESSURE: 70 MMHG | HEART RATE: 81 BPM | WEIGHT: 253 LBS | HEIGHT: 66 IN

## 2024-10-01 DIAGNOSIS — Z90.710 HISTORY OF HYSTERECTOMY: ICD-10-CM

## 2024-10-01 DIAGNOSIS — M32.8 OTHER FORMS OF SYSTEMIC LUPUS ERYTHEMATOSUS, UNSPECIFIED ORGAN INVOLVEMENT STATUS: ICD-10-CM

## 2024-10-01 DIAGNOSIS — M79.7 FIBROMYALGIA SYNDROME: ICD-10-CM

## 2024-10-01 DIAGNOSIS — R10.2 PELVIC PAIN: Primary | ICD-10-CM

## 2024-10-01 DIAGNOSIS — F41.9 ANXIETY: ICD-10-CM

## 2024-10-01 PROCEDURE — 99213 OFFICE O/P EST LOW 20 MIN: CPT | Mod: PBBFAC | Performed by: OBSTETRICS & GYNECOLOGY

## 2024-10-01 PROCEDURE — 1159F MED LIST DOCD IN RCRD: CPT | Mod: CPTII,,, | Performed by: OBSTETRICS & GYNECOLOGY

## 2024-10-01 PROCEDURE — 76856 US EXAM PELVIC COMPLETE: CPT | Mod: PBBFAC | Performed by: OBSTETRICS & GYNECOLOGY

## 2024-10-01 PROCEDURE — 3078F DIAST BP <80 MM HG: CPT | Mod: CPTII,,, | Performed by: OBSTETRICS & GYNECOLOGY

## 2024-10-01 PROCEDURE — 3074F SYST BP LT 130 MM HG: CPT | Mod: CPTII,,, | Performed by: OBSTETRICS & GYNECOLOGY

## 2024-10-01 PROCEDURE — 3008F BODY MASS INDEX DOCD: CPT | Mod: CPTII,,, | Performed by: OBSTETRICS & GYNECOLOGY

## 2024-10-01 PROCEDURE — 99213 OFFICE O/P EST LOW 20 MIN: CPT | Mod: S$PBB,,, | Performed by: OBSTETRICS & GYNECOLOGY

## 2024-10-01 PROCEDURE — 1160F RVW MEDS BY RX/DR IN RCRD: CPT | Mod: CPTII,,, | Performed by: OBSTETRICS & GYNECOLOGY

## 2024-10-01 PROCEDURE — 99999 PR PBB SHADOW E&M-EST. PATIENT-LVL III: CPT | Mod: PBBFAC,,, | Performed by: OBSTETRICS & GYNECOLOGY

## 2024-10-01 RX ORDER — GABAPENTIN 100 MG/1
100 CAPSULE ORAL 3 TIMES DAILY
COMMUNITY
Start: 2024-06-12

## 2024-10-01 RX ORDER — ETANERCEPT 50 MG/ML
50 SOLUTION SUBCUTANEOUS
COMMUNITY
Start: 2024-09-16

## 2024-10-01 RX ORDER — TRAZODONE HYDROCHLORIDE 50 MG/1
50-100 TABLET ORAL NIGHTLY
COMMUNITY
Start: 2024-09-16

## 2024-10-01 NOTE — PROGRESS NOTES
Subjective:    Patient ID: Nanda House is a 44 y.o. y.o. female    Chief Complaint:   Chief Complaint   Patient presents with    Abdominal Pain     Patient states that she has been experiencing left sided abdominal pain with ovulation. She states that it is severe and causes nausea, impaired movement, and migraines as well.        History of Present Illness:  Nanda presents today for evaluation of left-sided abdominal/pelvic pain.  She states that she has been experiencing this pain with ovulation.  She says it causes nausea, impaired movement, and migraines.  She states she has been doing some reading and associated with her autoimmune diseases and she is concerned for autoimmune oophoritis.  She states the pain feels like ice picks and also feels pressure like she would be about to lay an egg.    [place]      Review of Systems   Constitutional:  Negative for chills and fever.   Respiratory:  Negative for shortness of breath.    Cardiovascular:  Negative for chest pain.   Gastrointestinal:  Positive for abdominal pain. Negative for constipation.   Genitourinary:  Positive for pelvic pain.   Musculoskeletal:  Positive for arthralgias.   Neurological:  Negative for headaches.         Objective:    Vital Signs:  Vitals:    10/01/24 1125   BP: 121/70   Pulse: 81     Wt Readings from Last 1 Encounters:   10/01/24 114.8 kg (253 lb)     Body mass index is 40.84 kg/m².    Physical Exam:  General:  alert, no distress   Skin:  Skin color, texture, turgor normal. No rashes or lesions   Abdomen:  Soft, mild tender to palpation   Extremities: No cyanosis, clubbing, edema       Ultrasound: Uterus surgically absent.  Technically difficult study.  Difficult to clearly visualize ovaries.  They appeared to be within normal limits-nothing enlarged..  Left adnexa with free fluid noted.    Reassurance given that she does not have a large cyst on her ovary.  Discussed that the free fluid on the left side could be  evidence of a cyst that has ruptured.  Recommend supportive care with NSAIDs, heating pads, ice packs.  May also use essential oils like eucalyptus and peppermint.  All questions answered    I spent a total of 20 minutes on the day of the visit.  This includes face to face time and non-face to face time preparing to see the patient (eg, review of tests), obtaining and/or reviewing separately obtained history, documenting clinical information in the electronic or other health record, independently interpreting results and communicating results to the patient/family/caregiver, or care coordinator.           Assessment:      1. Pelvic pain    2. History of hysterectomy    3. Other forms of systemic lupus erythematosus, unspecified organ involvement status    4. Fibromyalgia syndrome    5. Anxiety          Plan:      Pelvic pain    History of hysterectomy    Other forms of systemic lupus erythematosus, unspecified organ involvement status    Fibromyalgia syndrome    Anxiety    RTC for annual or prn       Amira Ryder MD, FACOG   10/01/2024 11:29 AM

## 2024-10-02 ENCOUNTER — PATIENT MESSAGE (OUTPATIENT)
Dept: OBSTETRICS AND GYNECOLOGY | Facility: CLINIC | Age: 44
End: 2024-10-02
Payer: MEDICAID

## 2024-10-07 ENCOUNTER — TELEPHONE (OUTPATIENT)
Dept: OBSTETRICS AND GYNECOLOGY | Facility: CLINIC | Age: 44
End: 2024-10-07
Payer: MEDICAID

## 2024-11-19 ENCOUNTER — PATIENT MESSAGE (OUTPATIENT)
Dept: GASTROENTEROLOGY | Facility: CLINIC | Age: 44
End: 2024-11-19
Payer: MEDICAID
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
## (undated) DEVICE — SYR 10CC LUER LOCK

## (undated) DEVICE — SOL IRRI STRL WATER 1000ML

## (undated) DEVICE — SUT CHR GUT 3-0 RB-1 27IN

## (undated) DEVICE — SUT MCRYL PLUS UD 3-0 27IN

## (undated) DEVICE — SPONGE LAP 18X18 PREWASHED

## (undated) DEVICE — Device

## (undated) DEVICE — STRAP KNEE & BODY DISP 4X34IN

## (undated) DEVICE — DRAPE CORETEMP FLD WRM 56X62IN

## (undated) DEVICE — COVER OVERHEAD SURG LT BLUE

## (undated) DEVICE — GOWN SURGICAL BRTHBL XL

## (undated) DEVICE — BLANKET UPPER BODY 78.7X29.9IN

## (undated) DEVICE — CLOSURE SKIN STERI STRIP 1/2X4

## (undated) DEVICE — CLIPPER BLADE MOD 4406 (CAREF)

## (undated) DEVICE — APPLICATOR CHLORAPREP ORN 26ML

## (undated) DEVICE — SYR 50ML CATH TIP

## (undated) DEVICE — GLOVE BIOGEL ECLIPSE SZ 6.5

## (undated) DEVICE — NDL ECLIPSE SAFETY 18GX1-1/2IN

## (undated) DEVICE — TRAY MAJOR LAPAROTOMY SURG I

## (undated) DEVICE — SEALER LIGASURE IMPACT 18CM

## (undated) DEVICE — SUT 2-0 VICRYL / FS-1

## (undated) DEVICE — LINER GLOVE POWDERFREE SZ 6.5

## (undated) DEVICE — PAD UNDERPAD 30X30

## (undated) DEVICE — ELECTRODE EXTENDED BLADE

## (undated) DEVICE — SYR 30CC LUER LOCK

## (undated) DEVICE — LINER SUCTION 1500CC

## (undated) DEVICE — DRAPE ABD MAJ PCH 122X78X102IN

## (undated) DEVICE — SUT 0 36IN COATED VICRYL VI

## (undated) DEVICE — SYR ONLY LUER LOCK 20CC

## (undated) DEVICE — BINDER ABDOMINAL 9 30-45

## (undated) DEVICE — ELECTRODE REM PLYHSV RETURN 9